# Patient Record
Sex: MALE | Race: WHITE | NOT HISPANIC OR LATINO | URBAN - METROPOLITAN AREA
[De-identification: names, ages, dates, MRNs, and addresses within clinical notes are randomized per-mention and may not be internally consistent; named-entity substitution may affect disease eponyms.]

---

## 2017-09-27 ENCOUNTER — EMERGENCY (EMERGENCY)
Facility: HOSPITAL | Age: 62
LOS: 0 days | Discharge: HOME | End: 2017-09-27
Attending: EMERGENCY MEDICINE
Payer: SELF-PAY

## 2017-09-27 VITALS
OXYGEN SATURATION: 99 % | HEIGHT: 74 IN | WEIGHT: 210.1 LBS | DIASTOLIC BLOOD PRESSURE: 123 MMHG | HEART RATE: 75 BPM | SYSTOLIC BLOOD PRESSURE: 203 MMHG | RESPIRATION RATE: 16 BRPM | TEMPERATURE: 97 F

## 2017-09-27 VITALS
RESPIRATION RATE: 16 BRPM | TEMPERATURE: 98 F | DIASTOLIC BLOOD PRESSURE: 88 MMHG | SYSTOLIC BLOOD PRESSURE: 176 MMHG | HEART RATE: 71 BPM | OXYGEN SATURATION: 99 %

## 2017-09-27 DIAGNOSIS — I10 ESSENTIAL (PRIMARY) HYPERTENSION: ICD-10-CM

## 2017-09-27 DIAGNOSIS — Z91.14 PATIENT'S OTHER NONCOMPLIANCE WITH MEDICATION REGIMEN: ICD-10-CM

## 2017-09-27 DIAGNOSIS — R42 DIZZINESS AND GIDDINESS: ICD-10-CM

## 2017-09-27 DIAGNOSIS — R51 HEADACHE: ICD-10-CM

## 2017-09-27 DIAGNOSIS — E11.9 TYPE 2 DIABETES MELLITUS WITHOUT COMPLICATIONS: ICD-10-CM

## 2017-09-27 DIAGNOSIS — Z87.891 PERSONAL HISTORY OF NICOTINE DEPENDENCE: ICD-10-CM

## 2017-09-27 LAB
ALBUMIN SERPL ELPH-MCNC: 3.8 G/DL — SIGNIFICANT CHANGE UP (ref 3.3–5)
ALP SERPL-CCNC: 113 U/L — SIGNIFICANT CHANGE UP (ref 40–120)
ALT FLD-CCNC: 23 U/L — SIGNIFICANT CHANGE UP (ref 12–78)
ANION GAP SERPL CALC-SCNC: 8 MMOL/L — SIGNIFICANT CHANGE UP (ref 5–17)
APPEARANCE UR: CLEAR — SIGNIFICANT CHANGE UP
AST SERPL-CCNC: 15 U/L — SIGNIFICANT CHANGE UP (ref 15–37)
BASE EXCESS BLDV CALC-SCNC: 1.6 MMOL/L — SIGNIFICANT CHANGE UP (ref -2–2)
BASOPHILS # BLD AUTO: 0.1 K/UL — SIGNIFICANT CHANGE UP (ref 0–0.2)
BASOPHILS NFR BLD AUTO: 1.6 % — SIGNIFICANT CHANGE UP (ref 0–2)
BILIRUB SERPL-MCNC: 0.5 MG/DL — SIGNIFICANT CHANGE UP (ref 0.2–1.2)
BILIRUB UR-MCNC: NEGATIVE — SIGNIFICANT CHANGE UP
BLOOD GAS COMMENTS, VENOUS: SIGNIFICANT CHANGE UP
BUN SERPL-MCNC: 11 MG/DL — SIGNIFICANT CHANGE UP (ref 7–23)
CALCIUM SERPL-MCNC: 8 MG/DL — LOW (ref 8.5–10.1)
CHLORIDE SERPL-SCNC: 106 MMOL/L — SIGNIFICANT CHANGE UP (ref 96–108)
CK MB BLD-MCNC: 1.1 % — SIGNIFICANT CHANGE UP (ref 0–3.5)
CK MB CFR SERPL CALC: 1.7 NG/ML — SIGNIFICANT CHANGE UP (ref 0.5–3.6)
CK SERPL-CCNC: 149 U/L — SIGNIFICANT CHANGE UP (ref 26–308)
CO2 SERPL-SCNC: 25 MMOL/L — SIGNIFICANT CHANGE UP (ref 22–31)
COLOR SPEC: YELLOW — SIGNIFICANT CHANGE UP
CREAT SERPL-MCNC: 0.78 MG/DL — SIGNIFICANT CHANGE UP (ref 0.5–1.3)
DIFF PNL FLD: NEGATIVE — SIGNIFICANT CHANGE UP
EOSINOPHIL # BLD AUTO: 0.5 K/UL — SIGNIFICANT CHANGE UP (ref 0–0.5)
EOSINOPHIL NFR BLD AUTO: 7.3 % — HIGH (ref 0–6)
GAS PNL BLDV: SIGNIFICANT CHANGE UP
GLUCOSE SERPL-MCNC: 136 MG/DL — HIGH (ref 70–99)
GLUCOSE UR QL: NEGATIVE MG/DL — SIGNIFICANT CHANGE UP
HCO3 BLDV-SCNC: 26 MMOL/L — SIGNIFICANT CHANGE UP (ref 21–29)
HCT VFR BLD CALC: 40.4 % — SIGNIFICANT CHANGE UP (ref 39–50)
HGB BLD-MCNC: 13.9 G/DL — SIGNIFICANT CHANGE UP (ref 13–17)
HOROWITZ INDEX BLDV+IHG-RTO: 21 — SIGNIFICANT CHANGE UP
KETONES UR-MCNC: NEGATIVE — SIGNIFICANT CHANGE UP
LEUKOCYTE ESTERASE UR-ACNC: NEGATIVE — SIGNIFICANT CHANGE UP
LYMPHOCYTES # BLD AUTO: 1.6 K/UL — SIGNIFICANT CHANGE UP (ref 1–3.3)
LYMPHOCYTES # BLD AUTO: 24.9 % — SIGNIFICANT CHANGE UP (ref 13–44)
MCHC RBC-ENTMCNC: 29.6 PG — SIGNIFICANT CHANGE UP (ref 27–34)
MCHC RBC-ENTMCNC: 34.4 GM/DL — SIGNIFICANT CHANGE UP (ref 32–36)
MCV RBC AUTO: 86.1 FL — SIGNIFICANT CHANGE UP (ref 80–100)
MONOCYTES # BLD AUTO: 0.6 K/UL — SIGNIFICANT CHANGE UP (ref 0–0.9)
MONOCYTES NFR BLD AUTO: 8.9 % — SIGNIFICANT CHANGE UP (ref 2–14)
NEUTROPHILS # BLD AUTO: 3.6 K/UL — SIGNIFICANT CHANGE UP (ref 1.8–7.4)
NEUTROPHILS NFR BLD AUTO: 57.2 % — SIGNIFICANT CHANGE UP (ref 43–77)
NITRITE UR-MCNC: NEGATIVE — SIGNIFICANT CHANGE UP
PCO2 BLDV: 43 MMHG — SIGNIFICANT CHANGE UP (ref 35–50)
PH BLDV: 7.4 — SIGNIFICANT CHANGE UP (ref 7.35–7.45)
PH UR: 7 — SIGNIFICANT CHANGE UP (ref 5–8)
PLATELET # BLD AUTO: 238 K/UL — SIGNIFICANT CHANGE UP (ref 150–400)
PO2 BLDV: <40 MMHG — SIGNIFICANT CHANGE UP (ref 25–45)
POTASSIUM SERPL-MCNC: 3.8 MMOL/L — SIGNIFICANT CHANGE UP (ref 3.5–5.3)
POTASSIUM SERPL-SCNC: 3.8 MMOL/L — SIGNIFICANT CHANGE UP (ref 3.5–5.3)
PROT SERPL-MCNC: 7.7 GM/DL — SIGNIFICANT CHANGE UP (ref 6–8.3)
PROT UR-MCNC: NEGATIVE MG/DL — SIGNIFICANT CHANGE UP
RBC # BLD: 4.7 M/UL — SIGNIFICANT CHANGE UP (ref 4.2–5.8)
RBC # FLD: 11.7 % — SIGNIFICANT CHANGE UP (ref 11–15)
SAO2 % BLDV: 76 % — SIGNIFICANT CHANGE UP (ref 67–88)
SODIUM SERPL-SCNC: 139 MMOL/L — SIGNIFICANT CHANGE UP (ref 135–145)
SP GR SPEC: 1 — LOW (ref 1.01–1.02)
TROPONIN I SERPL-MCNC: <.015 NG/ML — SIGNIFICANT CHANGE UP (ref 0.01–0.04)
UROBILINOGEN FLD QL: NEGATIVE MG/DL — SIGNIFICANT CHANGE UP
WBC # BLD: 6.3 K/UL — SIGNIFICANT CHANGE UP (ref 3.8–10.5)
WBC # FLD AUTO: 6.3 K/UL — SIGNIFICANT CHANGE UP (ref 3.8–10.5)

## 2017-09-27 PROCEDURE — 99053 MED SERV 10PM-8AM 24 HR FAC: CPT

## 2017-09-27 PROCEDURE — 99285 EMERGENCY DEPT VISIT HI MDM: CPT | Mod: 25

## 2017-09-27 PROCEDURE — 70450 CT HEAD/BRAIN W/O DYE: CPT | Mod: 26

## 2017-09-27 PROCEDURE — 93010 ELECTROCARDIOGRAM REPORT: CPT

## 2017-09-27 RX ORDER — HYDRALAZINE HCL 50 MG
10 TABLET ORAL ONCE
Qty: 0 | Refills: 0 | Status: COMPLETED | OUTPATIENT
Start: 2017-09-27 | End: 2017-09-27

## 2017-09-27 RX ORDER — AMLODIPINE BESYLATE 2.5 MG/1
1 TABLET ORAL
Qty: 30 | Refills: 0 | OUTPATIENT
Start: 2017-09-27 | End: 2017-10-27

## 2017-09-27 RX ORDER — LABETALOL HCL 100 MG
10 TABLET ORAL ONCE
Qty: 0 | Refills: 0 | Status: COMPLETED | OUTPATIENT
Start: 2017-09-27 | End: 2017-09-27

## 2017-09-27 RX ORDER — AMLODIPINE BESYLATE 2.5 MG/1
1 TABLET ORAL
Qty: 30 | Refills: 0
Start: 2017-09-27 | End: 2017-10-27

## 2017-09-27 RX ORDER — LABETALOL HCL 100 MG
20 TABLET ORAL ONCE
Qty: 0 | Refills: 0 | Status: DISCONTINUED | OUTPATIENT
Start: 2017-09-27 | End: 2017-09-27

## 2017-09-27 RX ADMIN — Medication 10 MILLIGRAM(S): at 08:19

## 2017-09-27 RX ADMIN — Medication 10 MILLIGRAM(S): at 14:49

## 2017-09-27 NOTE — ED ADULT NURSE NOTE - OBJECTIVE STATEMENT
pt c/o dizziness last night while walking, pt checked bp at home, spb 180. pt states has not taken meds for DM or htn. denies cp, sob,

## 2017-09-27 NOTE — ED PROVIDER NOTE - MEDICAL DECISION MAKING DETAILS
61yo M pmh as above, non compliant w/ meds, here for headache, dizziness, high BP. No acute changes on CT head, labs not concerning. Will dc on amlodipine with instructions for immediate pmd f/u for further BP control. Pt understands and agrees w/ plan.

## 2017-09-27 NOTE — ED PROVIDER NOTE - PHYSICAL EXAMINATION
Gen: Alert, NAD  Head: NC, AT, PERRL, EOMI, normal lids/conjunctiva  ENT:  normal hearing, patent oropharynx without erythema/exudate, uvula midline  Neck: +supple, no tenderness/meningismus/JVD, +Trachea midline  Chest: no chest wall tenderness, equal chest rise  Pulm: Bilateral BS, normal resp effort, no wheeze/stridor/retractions  CV: RRR, no M/R/G, +dist pulses  Abd: soft, NT/ND, +BS, no hepatosplenomegaly  Rectal: deferred  Mskel: no edema/erythema/cyanosis  Skin: no rash  Neuro: AAOx3, no sensory/motor deficits, CN 2-12 intact

## 2017-09-27 NOTE — ED PROVIDER NOTE - OBJECTIVE STATEMENT
Pertinent PMH/PSH/FHx/SHx and Review of Systems contained within:  61yo M pmh of diabetes (diet controlled), and htn (not on any meds), presents c/o transient lightheadedness while walking ystdy and again this morning, lasting a few seconds each time. Pt took BP at home last night and systolics were in 180s, then took again this morning and systolic was 190s, therefore decided to come for evaluation. Pt reports that he was seen by endocrinologist last year who wanted to start him Pertinent PMH/PSH/FHx/SHx and Review of Systems contained within:  61yo M pmh of diabetes (diet controlled), and htn (not on any meds), presents c/o transient lightheadedness while walking ystdy and again this morning, lasting a few seconds each time. Pt took BP at home last night and systolics were in 180s, then took again this morning and systolic was 190s, therefore decided to come for evaluation. Pt reports that he was seen by endocrinologist last year who wanted to start him on insulin, but pt refused and has been taking it upon himself to control DM with diet (states FS have been in 120s and 130s). At the time, doctor also told him his BP was high and wanted to start anti-hypertensive, but pt refused. He denies ha or focal neuro symptoms. Stopped smoking 10years ago, occasional etoh use, denies drug use. No blood thinners. No fever/chills, No photophobia/eye pain/changes in vision, No ear pain/sore throat/dysphagia, No chest pain/palpitations, no SOB/cough/wheeze/stridor, No abdominal pain, No N/V/D, no dysuria/frequency/discharge, No neck/back pain, no rash.

## 2020-01-01 ENCOUNTER — EMERGENCY (EMERGENCY)
Facility: HOSPITAL | Age: 65
LOS: 1 days | Discharge: HOME | End: 2020-01-01
Attending: EMERGENCY MEDICINE
Payer: MEDICAID

## 2020-01-01 ENCOUNTER — INPATIENT (INPATIENT)
Facility: HOSPITAL | Age: 65
LOS: 5 days | Discharge: ANOTHER HOSPITAL | DRG: 637 | End: 2020-04-09
Attending: INTERNAL MEDICINE | Admitting: INTERNAL MEDICINE
Payer: MEDICAID

## 2020-01-01 ENCOUNTER — INPATIENT (INPATIENT)
Facility: HOSPITAL | Age: 65
LOS: 2 days | DRG: 137 | End: 2020-04-12
Attending: INTERNAL MEDICINE | Admitting: INTERNAL MEDICINE
Payer: MEDICAID

## 2020-01-01 VITALS — OXYGEN SATURATION: 95 % | HEART RATE: 101 BPM

## 2020-01-01 VITALS
RESPIRATION RATE: 16 BRPM | WEIGHT: 199.96 LBS | TEMPERATURE: 98 F | DIASTOLIC BLOOD PRESSURE: 108 MMHG | SYSTOLIC BLOOD PRESSURE: 163 MMHG | HEART RATE: 70 BPM | HEIGHT: 74 IN | OXYGEN SATURATION: 98 %

## 2020-01-01 VITALS
SYSTOLIC BLOOD PRESSURE: 150 MMHG | HEIGHT: 74 IN | RESPIRATION RATE: 24 BRPM | WEIGHT: 169.98 LBS | TEMPERATURE: 99 F | OXYGEN SATURATION: 99 % | DIASTOLIC BLOOD PRESSURE: 114 MMHG | HEART RATE: 118 BPM

## 2020-01-01 VITALS — HEART RATE: 119 BPM | RESPIRATION RATE: 10 BRPM | OXYGEN SATURATION: 74 %

## 2020-01-01 VITALS
DIASTOLIC BLOOD PRESSURE: 77 MMHG | SYSTOLIC BLOOD PRESSURE: 98 MMHG | RESPIRATION RATE: 26 BRPM | HEART RATE: 100 BPM | OXYGEN SATURATION: 98 %

## 2020-01-01 DIAGNOSIS — J44.9 CHRONIC OBSTRUCTIVE PULMONARY DISEASE, UNSPECIFIED: ICD-10-CM

## 2020-01-01 DIAGNOSIS — E11.65 TYPE 2 DIABETES MELLITUS WITH HYPERGLYCEMIA: ICD-10-CM

## 2020-01-01 DIAGNOSIS — E87.0 HYPEROSMOLALITY AND HYPERNATREMIA: ICD-10-CM

## 2020-01-01 DIAGNOSIS — I95.9 HYPOTENSION, UNSPECIFIED: ICD-10-CM

## 2020-01-01 DIAGNOSIS — E11.10 TYPE 2 DIABETES MELLITUS WITH KETOACIDOSIS WITHOUT COMA: ICD-10-CM

## 2020-01-01 DIAGNOSIS — J96.01 ACUTE RESPIRATORY FAILURE WITH HYPOXIA: ICD-10-CM

## 2020-01-01 DIAGNOSIS — E10.10 TYPE 1 DIABETES MELLITUS WITH KETOACIDOSIS WITHOUT COMA: ICD-10-CM

## 2020-01-01 DIAGNOSIS — J18.9 PNEUMONIA, UNSPECIFIED ORGANISM: ICD-10-CM

## 2020-01-01 DIAGNOSIS — Z91.14 PATIENT'S OTHER NONCOMPLIANCE WITH MEDICATION REGIMEN: ICD-10-CM

## 2020-01-01 DIAGNOSIS — E87.6 HYPOKALEMIA: ICD-10-CM

## 2020-01-01 DIAGNOSIS — E83.39 OTHER DISORDERS OF PHOSPHORUS METABOLISM: ICD-10-CM

## 2020-01-01 DIAGNOSIS — E11.9 TYPE 2 DIABETES MELLITUS WITHOUT COMPLICATIONS: ICD-10-CM

## 2020-01-01 DIAGNOSIS — J69.0 PNEUMONITIS DUE TO INHALATION OF FOOD AND VOMIT: ICD-10-CM

## 2020-01-01 DIAGNOSIS — Z29.9 ENCOUNTER FOR PROPHYLACTIC MEASURES, UNSPECIFIED: ICD-10-CM

## 2020-01-01 DIAGNOSIS — Z91.11 PATIENT'S NONCOMPLIANCE WITH DIETARY REGIMEN: ICD-10-CM

## 2020-01-01 DIAGNOSIS — I47.2 VENTRICULAR TACHYCARDIA: ICD-10-CM

## 2020-01-01 DIAGNOSIS — Z66 DO NOT RESUSCITATE: ICD-10-CM

## 2020-01-01 LAB
4/8 RATIO: 1.59 RATIO — SIGNIFICANT CHANGE UP (ref 0.9–3.6)
4/8 RATIO: 2.2 RATIO — SIGNIFICANT CHANGE UP (ref 0.9–3.6)
A-TUMOR NECROSIS FACT SERPL-MCNC: <5 PG/ML — SIGNIFICANT CHANGE UP
ABS CD8: 161 /UL — SIGNIFICANT CHANGE UP (ref 142–740)
ABS CD8: 169 /UL — SIGNIFICANT CHANGE UP (ref 142–740)
ACETONE SERPL-MCNC: ABNORMAL
ALBUMIN SERPL ELPH-MCNC: 1.8 G/DL — LOW (ref 3.3–5)
ALBUMIN SERPL ELPH-MCNC: 2 G/DL — LOW (ref 3.5–5)
ALBUMIN SERPL ELPH-MCNC: 2 G/DL — LOW (ref 3.5–5)
ALBUMIN SERPL ELPH-MCNC: 2.2 G/DL — LOW (ref 3.5–5)
ALBUMIN SERPL ELPH-MCNC: 2.5 G/DL — LOW (ref 3.5–5)
ALBUMIN SERPL ELPH-MCNC: 2.5 G/DL — LOW (ref 3.5–5)
ALBUMIN SERPL ELPH-MCNC: 3.2 G/DL — LOW (ref 3.5–5)
ALBUMIN SERPL ELPH-MCNC: SIGNIFICANT CHANGE UP G/DL (ref 3.5–5)
ALP SERPL-CCNC: 114 U/L — SIGNIFICANT CHANGE UP (ref 40–120)
ALP SERPL-CCNC: 115 U/L — SIGNIFICANT CHANGE UP (ref 40–120)
ALP SERPL-CCNC: 130 U/L — HIGH (ref 40–120)
ALP SERPL-CCNC: 167 U/L — HIGH (ref 40–120)
ALP SERPL-CCNC: 207 U/L — HIGH (ref 40–120)
ALP SERPL-CCNC: 220 U/L — HIGH (ref 40–120)
ALP SERPL-CCNC: 94 U/L — SIGNIFICANT CHANGE UP (ref 40–120)
ALP SERPL-CCNC: SIGNIFICANT CHANGE UP U/L (ref 40–120)
ALT FLD-CCNC: 34 U/L DA — SIGNIFICANT CHANGE UP (ref 10–60)
ALT FLD-CCNC: 36 U/L DA — SIGNIFICANT CHANGE UP (ref 10–60)
ALT FLD-CCNC: 37 U/L DA — SIGNIFICANT CHANGE UP (ref 10–60)
ALT FLD-CCNC: 41 U/L — SIGNIFICANT CHANGE UP (ref 12–78)
ALT FLD-CCNC: 43 U/L DA — SIGNIFICANT CHANGE UP (ref 10–60)
ALT FLD-CCNC: 46 U/L DA — SIGNIFICANT CHANGE UP (ref 10–60)
ALT FLD-CCNC: 52 U/L DA — SIGNIFICANT CHANGE UP (ref 10–60)
ALT FLD-CCNC: SIGNIFICANT CHANGE UP U/L DA (ref 10–60)
ANION GAP SERPL CALC-SCNC: 10 MMOL/L — SIGNIFICANT CHANGE UP (ref 5–17)
ANION GAP SERPL CALC-SCNC: 12 MMOL/L — SIGNIFICANT CHANGE UP (ref 5–17)
ANION GAP SERPL CALC-SCNC: 12 MMOL/L — SIGNIFICANT CHANGE UP (ref 5–17)
ANION GAP SERPL CALC-SCNC: 13 MMOL/L — SIGNIFICANT CHANGE UP (ref 5–17)
ANION GAP SERPL CALC-SCNC: 15 MMOL/L — SIGNIFICANT CHANGE UP (ref 5–17)
ANION GAP SERPL CALC-SCNC: 16 MMOL/L — SIGNIFICANT CHANGE UP (ref 5–17)
ANION GAP SERPL CALC-SCNC: 17 MMOL/L — SIGNIFICANT CHANGE UP (ref 5–17)
ANION GAP SERPL CALC-SCNC: 18 MMOL/L — HIGH (ref 5–17)
ANION GAP SERPL CALC-SCNC: 19 MMOL/L — HIGH (ref 5–17)
ANION GAP SERPL CALC-SCNC: 20 MMOL/L — HIGH (ref 5–17)
ANION GAP SERPL CALC-SCNC: 24 MMOL/L — HIGH (ref 5–17)
ANION GAP SERPL CALC-SCNC: 4 MMOL/L — LOW (ref 5–17)
ANION GAP SERPL CALC-SCNC: 5 MMOL/L — SIGNIFICANT CHANGE UP (ref 5–17)
ANION GAP SERPL CALC-SCNC: 6 MMOL/L — SIGNIFICANT CHANGE UP (ref 5–17)
ANION GAP SERPL CALC-SCNC: 6 MMOL/L — SIGNIFICANT CHANGE UP (ref 5–17)
ANION GAP SERPL CALC-SCNC: 7 MMOL/L — SIGNIFICANT CHANGE UP (ref 5–17)
ANION GAP SERPL CALC-SCNC: 7 MMOL/L — SIGNIFICANT CHANGE UP (ref 5–17)
ANION GAP SERPL CALC-SCNC: 8 MMOL/L — SIGNIFICANT CHANGE UP (ref 5–17)
ANION GAP SERPL CALC-SCNC: 8 MMOL/L — SIGNIFICANT CHANGE UP (ref 5–17)
ANION GAP SERPL CALC-SCNC: SIGNIFICANT CHANGE UP MMOL/L (ref 5–17)
ANISOCYTOSIS BLD QL: SLIGHT — SIGNIFICANT CHANGE UP
APPEARANCE UR: CLEAR — SIGNIFICANT CHANGE UP
APTT BLD: 27.8 SEC — SIGNIFICANT CHANGE UP (ref 27.5–36.3)
AST SERPL-CCNC: 101 U/L — HIGH (ref 10–40)
AST SERPL-CCNC: 27 U/L — SIGNIFICANT CHANGE UP (ref 10–40)
AST SERPL-CCNC: 34 U/L — SIGNIFICANT CHANGE UP (ref 10–40)
AST SERPL-CCNC: 41 U/L — HIGH (ref 10–40)
AST SERPL-CCNC: 42 U/L — HIGH (ref 10–40)
AST SERPL-CCNC: 43 U/L — HIGH (ref 15–37)
AST SERPL-CCNC: 55 U/L — HIGH (ref 10–40)
AST SERPL-CCNC: SIGNIFICANT CHANGE UP U/L (ref 10–40)
BACTERIA # UR AUTO: ABNORMAL /HPF
BASE EXCESS BLDA CALC-SCNC: -1 MMOL/L — SIGNIFICANT CHANGE UP (ref -2–2)
BASE EXCESS BLDA CALC-SCNC: -1.2 MMOL/L — SIGNIFICANT CHANGE UP (ref -2–2)
BASE EXCESS BLDA CALC-SCNC: -1.3 MMOL/L — SIGNIFICANT CHANGE UP (ref -2–2)
BASE EXCESS BLDA CALC-SCNC: -1.8 MMOL/L — SIGNIFICANT CHANGE UP (ref -2–2)
BASE EXCESS BLDA CALC-SCNC: -14.2 MMOL/L — LOW (ref -2–2)
BASE EXCESS BLDA CALC-SCNC: -2.2 MMOL/L — LOW (ref -2–2)
BASE EXCESS BLDA CALC-SCNC: -2.3 MMOL/L — LOW (ref -2–2)
BASE EXCESS BLDA CALC-SCNC: -23.2 MMOL/L — LOW (ref -2–2)
BASE EXCESS BLDA CALC-SCNC: -23.2 MMOL/L — LOW (ref -2–2)
BASE EXCESS BLDA CALC-SCNC: -8.7 MMOL/L — LOW (ref -2–2)
BASOPHILS # BLD AUTO: 0.02 K/UL — SIGNIFICANT CHANGE UP (ref 0–0.2)
BASOPHILS # BLD AUTO: 0.03 K/UL — SIGNIFICANT CHANGE UP (ref 0–0.2)
BASOPHILS NFR BLD AUTO: 0.2 % — SIGNIFICANT CHANGE UP (ref 0–2)
BASOPHILS NFR BLD AUTO: 0.3 % — SIGNIFICANT CHANGE UP (ref 0–2)
BASOPHILS NFR BLD AUTO: 0.3 % — SIGNIFICANT CHANGE UP (ref 0–2)
BASOPHILS NFR BLD AUTO: 0.4 % — SIGNIFICANT CHANGE UP (ref 0–2)
BASOPHILS NFR BLD AUTO: 0.4 % — SIGNIFICANT CHANGE UP (ref 0–2)
BILIRUB SERPL-MCNC: 0.3 MG/DL — SIGNIFICANT CHANGE UP (ref 0.2–1.2)
BILIRUB SERPL-MCNC: 0.4 MG/DL — SIGNIFICANT CHANGE UP (ref 0.2–1.2)
BILIRUB SERPL-MCNC: 0.5 MG/DL — SIGNIFICANT CHANGE UP (ref 0.2–1.2)
BILIRUB SERPL-MCNC: 0.5 MG/DL — SIGNIFICANT CHANGE UP (ref 0.2–1.2)
BILIRUB SERPL-MCNC: SIGNIFICANT CHANGE UP MG/DL (ref 0.2–1.2)
BILIRUB UR-MCNC: NEGATIVE — SIGNIFICANT CHANGE UP
BLOOD GAS COMMENTS ARTERIAL: SIGNIFICANT CHANGE UP
BUN SERPL-MCNC: 15 MG/DL — SIGNIFICANT CHANGE UP (ref 7–18)
BUN SERPL-MCNC: 16 MG/DL — SIGNIFICANT CHANGE UP (ref 7–18)
BUN SERPL-MCNC: 17 MG/DL — SIGNIFICANT CHANGE UP (ref 7–18)
BUN SERPL-MCNC: 17 MG/DL — SIGNIFICANT CHANGE UP (ref 7–18)
BUN SERPL-MCNC: 19 MG/DL — HIGH (ref 7–18)
BUN SERPL-MCNC: 23 MG/DL — HIGH (ref 7–18)
BUN SERPL-MCNC: 24 MG/DL — HIGH (ref 7–18)
BUN SERPL-MCNC: 24 MG/DL — HIGH (ref 7–18)
BUN SERPL-MCNC: 25 MG/DL — HIGH (ref 7–18)
BUN SERPL-MCNC: 29 MG/DL — HIGH (ref 7–18)
BUN SERPL-MCNC: 30 MG/DL — HIGH (ref 7–18)
BUN SERPL-MCNC: 35 MG/DL — HIGH (ref 7–18)
BUN SERPL-MCNC: 38 MG/DL — HIGH (ref 7–18)
BUN SERPL-MCNC: 41 MG/DL — HIGH (ref 7–18)
BUN SERPL-MCNC: 43 MG/DL — HIGH (ref 7–23)
BUN SERPL-MCNC: 46 MG/DL — HIGH (ref 7–23)
BUN SERPL-MCNC: 49 MG/DL — HIGH (ref 7–23)
BUN SERPL-MCNC: SIGNIFICANT CHANGE UP MG/DL (ref 7–18)
CALCIUM SERPL-MCNC: 7.8 MG/DL — LOW (ref 8.5–10.1)
CALCIUM SERPL-MCNC: 7.8 MG/DL — LOW (ref 8.5–10.1)
CALCIUM SERPL-MCNC: 8.1 MG/DL — LOW (ref 8.4–10.5)
CALCIUM SERPL-MCNC: 8.2 MG/DL — LOW (ref 8.4–10.5)
CALCIUM SERPL-MCNC: 8.2 MG/DL — LOW (ref 8.5–10.1)
CALCIUM SERPL-MCNC: 8.3 MG/DL — LOW (ref 8.4–10.5)
CALCIUM SERPL-MCNC: 8.5 MG/DL — SIGNIFICANT CHANGE UP (ref 8.4–10.5)
CALCIUM SERPL-MCNC: 8.6 MG/DL — SIGNIFICANT CHANGE UP (ref 8.4–10.5)
CALCIUM SERPL-MCNC: 8.6 MG/DL — SIGNIFICANT CHANGE UP (ref 8.4–10.5)
CALCIUM SERPL-MCNC: 8.7 MG/DL — SIGNIFICANT CHANGE UP (ref 8.4–10.5)
CALCIUM SERPL-MCNC: 8.8 MG/DL — SIGNIFICANT CHANGE UP (ref 8.4–10.5)
CALCIUM SERPL-MCNC: 9.1 MG/DL — SIGNIFICANT CHANGE UP (ref 8.4–10.5)
CALCIUM SERPL-MCNC: 9.4 MG/DL — SIGNIFICANT CHANGE UP (ref 8.4–10.5)
CALCIUM SERPL-MCNC: 9.7 MG/DL — SIGNIFICANT CHANGE UP (ref 8.4–10.5)
CALCIUM SERPL-MCNC: SIGNIFICANT CHANGE UP MG/DL (ref 8.4–10.5)
CD3 BLASTS SPEC-ACNC: 39 % — LOW (ref 59–83)
CD3 BLASTS SPEC-ACNC: 451 /UL — LOW (ref 672–1870)
CD3 BLASTS SPEC-ACNC: 557 /UL — LOW (ref 672–1870)
CD3 BLASTS SPEC-ACNC: 64 % — SIGNIFICANT CHANGE UP (ref 59–83)
CD4 %: 22 % — LOW (ref 30–62)
CD4 %: 43 % — SIGNIFICANT CHANGE UP (ref 30–62)
CD8 %: 14 % — SIGNIFICANT CHANGE UP (ref 12–36)
CD8 %: 19 % — SIGNIFICANT CHANGE UP (ref 12–36)
CHLORIDE SERPL-SCNC: 100 MMOL/L — SIGNIFICANT CHANGE UP (ref 96–108)
CHLORIDE SERPL-SCNC: 110 MMOL/L — HIGH (ref 96–108)
CHLORIDE SERPL-SCNC: 117 MMOL/L — HIGH (ref 96–108)
CHLORIDE SERPL-SCNC: 119 MMOL/L — HIGH (ref 96–108)
CHLORIDE SERPL-SCNC: 119 MMOL/L — HIGH (ref 96–108)
CHLORIDE SERPL-SCNC: 120 MMOL/L — HIGH (ref 96–108)
CHLORIDE SERPL-SCNC: 123 MMOL/L — HIGH (ref 96–108)
CHLORIDE SERPL-SCNC: 125 MMOL/L — HIGH (ref 96–108)
CHLORIDE SERPL-SCNC: 125 MMOL/L — HIGH (ref 96–108)
CHLORIDE SERPL-SCNC: 126 MMOL/L — HIGH (ref 96–108)
CHLORIDE SERPL-SCNC: 129 MMOL/L — HIGH (ref 96–108)
CHLORIDE SERPL-SCNC: 130 MMOL/L — HIGH (ref 96–108)
CHLORIDE SERPL-SCNC: 131 MMOL/L — HIGH (ref 96–108)
CHLORIDE SERPL-SCNC: 131 MMOL/L — HIGH (ref 96–108)
CHLORIDE SERPL-SCNC: 132 MMOL/L — HIGH (ref 96–108)
CHLORIDE SERPL-SCNC: 135 MMOL/L — HIGH (ref 96–108)
CHLORIDE SERPL-SCNC: 135 MMOL/L — HIGH (ref 96–108)
CHLORIDE SERPL-SCNC: SIGNIFICANT CHANGE UP MMOL/L (ref 96–108)
CK SERPL-CCNC: SIGNIFICANT CHANGE UP U/L (ref 35–232)
CO2 SERPL-SCNC: 10 MMOL/L — CRITICAL LOW (ref 22–31)
CO2 SERPL-SCNC: 12 MMOL/L — LOW (ref 22–31)
CO2 SERPL-SCNC: 13 MMOL/L — LOW (ref 22–31)
CO2 SERPL-SCNC: 13 MMOL/L — LOW (ref 22–31)
CO2 SERPL-SCNC: 14 MMOL/L — LOW (ref 22–31)
CO2 SERPL-SCNC: 15 MMOL/L — LOW (ref 22–31)
CO2 SERPL-SCNC: 16 MMOL/L — LOW (ref 22–31)
CO2 SERPL-SCNC: 19 MMOL/L — LOW (ref 22–31)
CO2 SERPL-SCNC: 20 MMOL/L — LOW (ref 22–31)
CO2 SERPL-SCNC: 22 MMOL/L — SIGNIFICANT CHANGE UP (ref 22–31)
CO2 SERPL-SCNC: 22 MMOL/L — SIGNIFICANT CHANGE UP (ref 22–31)
CO2 SERPL-SCNC: 23 MMOL/L — SIGNIFICANT CHANGE UP (ref 22–31)
CO2 SERPL-SCNC: 23 MMOL/L — SIGNIFICANT CHANGE UP (ref 22–31)
CO2 SERPL-SCNC: 24 MMOL/L — SIGNIFICANT CHANGE UP (ref 22–31)
CO2 SERPL-SCNC: 25 MMOL/L — SIGNIFICANT CHANGE UP (ref 22–31)
CO2 SERPL-SCNC: 25 MMOL/L — SIGNIFICANT CHANGE UP (ref 22–31)
CO2 SERPL-SCNC: 26 MMOL/L — SIGNIFICANT CHANGE UP (ref 22–31)
CO2 SERPL-SCNC: 26 MMOL/L — SIGNIFICANT CHANGE UP (ref 22–31)
CO2 SERPL-SCNC: 9 MMOL/L — CRITICAL LOW (ref 22–31)
CO2 SERPL-SCNC: SIGNIFICANT CHANGE UP MMOL/L (ref 22–31)
COLOR SPEC: YELLOW — SIGNIFICANT CHANGE UP
CREAT SERPL-MCNC: 0.91 MG/DL — SIGNIFICANT CHANGE UP (ref 0.5–1.3)
CREAT SERPL-MCNC: 0.95 MG/DL — SIGNIFICANT CHANGE UP (ref 0.5–1.3)
CREAT SERPL-MCNC: 0.98 MG/DL — SIGNIFICANT CHANGE UP (ref 0.5–1.3)
CREAT SERPL-MCNC: 0.99 MG/DL — SIGNIFICANT CHANGE UP (ref 0.5–1.3)
CREAT SERPL-MCNC: 1.05 MG/DL — SIGNIFICANT CHANGE UP (ref 0.5–1.3)
CREAT SERPL-MCNC: 1.05 MG/DL — SIGNIFICANT CHANGE UP (ref 0.5–1.3)
CREAT SERPL-MCNC: 1.08 MG/DL — SIGNIFICANT CHANGE UP (ref 0.5–1.3)
CREAT SERPL-MCNC: 1.08 MG/DL — SIGNIFICANT CHANGE UP (ref 0.5–1.3)
CREAT SERPL-MCNC: 1.12 MG/DL — SIGNIFICANT CHANGE UP (ref 0.5–1.3)
CREAT SERPL-MCNC: 1.12 MG/DL — SIGNIFICANT CHANGE UP (ref 0.5–1.3)
CREAT SERPL-MCNC: 1.17 MG/DL — SIGNIFICANT CHANGE UP (ref 0.5–1.3)
CREAT SERPL-MCNC: 1.18 MG/DL — SIGNIFICANT CHANGE UP (ref 0.5–1.3)
CREAT SERPL-MCNC: 1.2 MG/DL — SIGNIFICANT CHANGE UP (ref 0.5–1.3)
CREAT SERPL-MCNC: 1.27 MG/DL — SIGNIFICANT CHANGE UP (ref 0.5–1.3)
CREAT SERPL-MCNC: 1.31 MG/DL — HIGH (ref 0.5–1.3)
CREAT SERPL-MCNC: 1.49 MG/DL — HIGH (ref 0.5–1.3)
CREAT SERPL-MCNC: 1.51 MG/DL — HIGH (ref 0.5–1.3)
CREAT SERPL-MCNC: 1.54 MG/DL — HIGH (ref 0.5–1.3)
CREAT SERPL-MCNC: 1.62 MG/DL — HIGH (ref 0.5–1.3)
CREAT SERPL-MCNC: 1.62 MG/DL — HIGH (ref 0.5–1.3)
CREAT SERPL-MCNC: 1.65 MG/DL — HIGH (ref 0.5–1.3)
CREAT SERPL-MCNC: 1.7 MG/DL — HIGH (ref 0.5–1.3)
CRP SERPL-MCNC: 10.68 MG/DL — HIGH (ref 0–0.4)
CRP SERPL-MCNC: 6.64 MG/DL — HIGH (ref 0–0.4)
CULTURE RESULTS: SIGNIFICANT CHANGE UP
D DIMER BLD IA.RAPID-MCNC: 1727 NG/ML DDU — HIGH
DIFF PNL FLD: ABNORMAL
EOSINOPHIL # BLD AUTO: 0 K/UL — SIGNIFICANT CHANGE UP (ref 0–0.5)
EOSINOPHIL # BLD AUTO: 0.01 K/UL — SIGNIFICANT CHANGE UP (ref 0–0.5)
EOSINOPHIL # BLD AUTO: 0.01 K/UL — SIGNIFICANT CHANGE UP (ref 0–0.5)
EOSINOPHIL # BLD AUTO: 0.03 K/UL — SIGNIFICANT CHANGE UP (ref 0–0.5)
EOSINOPHIL # BLD AUTO: 0.05 K/UL — SIGNIFICANT CHANGE UP (ref 0–0.5)
EOSINOPHIL NFR BLD AUTO: 0 % — SIGNIFICANT CHANGE UP (ref 0–6)
EOSINOPHIL NFR BLD AUTO: 0.1 % — SIGNIFICANT CHANGE UP (ref 0–6)
EOSINOPHIL NFR BLD AUTO: 0.1 % — SIGNIFICANT CHANGE UP (ref 0–6)
EOSINOPHIL NFR BLD AUTO: 0.3 % — SIGNIFICANT CHANGE UP (ref 0–6)
EOSINOPHIL NFR BLD AUTO: 0.5 % — SIGNIFICANT CHANGE UP (ref 0–6)
EPI CELLS # UR: ABNORMAL /HPF
ERYTHROCYTE [SEDIMENTATION RATE] IN BLOOD: 40 MM/HR — HIGH (ref 0–20)
FERRITIN SERPL-MCNC: 1017 NG/ML — HIGH (ref 30–400)
FERRITIN SERPL-MCNC: 1127 NG/ML — HIGH (ref 30–400)
FERRITIN SERPL-MCNC: 2618 NG/ML — HIGH (ref 30–400)
FLU A RESULT: SIGNIFICANT CHANGE UP
FLU A RESULT: SIGNIFICANT CHANGE UP
FLUAV AG NPH QL: SIGNIFICANT CHANGE UP
FLUBV AG NPH QL: SIGNIFICANT CHANGE UP
G6PD RBC-CCNC: 12.9 U/G HGB — SIGNIFICANT CHANGE UP (ref 7–20.5)
GAS PNL BLDA: SIGNIFICANT CHANGE UP
GLUCOSE BLDC GLUCOMTR-MCNC: 111 MG/DL — HIGH (ref 70–99)
GLUCOSE BLDC GLUCOMTR-MCNC: 118 MG/DL — HIGH (ref 70–99)
GLUCOSE BLDC GLUCOMTR-MCNC: 124 MG/DL — HIGH (ref 70–99)
GLUCOSE BLDC GLUCOMTR-MCNC: 159 MG/DL — HIGH (ref 70–99)
GLUCOSE BLDC GLUCOMTR-MCNC: 179 MG/DL — HIGH (ref 70–99)
GLUCOSE BLDC GLUCOMTR-MCNC: 182 MG/DL — HIGH (ref 70–99)
GLUCOSE BLDC GLUCOMTR-MCNC: 182 MG/DL — HIGH (ref 70–99)
GLUCOSE BLDC GLUCOMTR-MCNC: 186 MG/DL — HIGH (ref 70–99)
GLUCOSE BLDC GLUCOMTR-MCNC: 197 MG/DL — HIGH (ref 70–99)
GLUCOSE BLDC GLUCOMTR-MCNC: 204 MG/DL — HIGH (ref 70–99)
GLUCOSE BLDC GLUCOMTR-MCNC: 204 MG/DL — HIGH (ref 70–99)
GLUCOSE BLDC GLUCOMTR-MCNC: 216 MG/DL — HIGH (ref 70–99)
GLUCOSE BLDC GLUCOMTR-MCNC: 217 MG/DL — HIGH (ref 70–99)
GLUCOSE BLDC GLUCOMTR-MCNC: 221 MG/DL — HIGH (ref 70–99)
GLUCOSE BLDC GLUCOMTR-MCNC: 222 MG/DL — HIGH (ref 70–99)
GLUCOSE BLDC GLUCOMTR-MCNC: 231 MG/DL — HIGH (ref 70–99)
GLUCOSE BLDC GLUCOMTR-MCNC: 247 MG/DL — HIGH (ref 70–99)
GLUCOSE BLDC GLUCOMTR-MCNC: 294 MG/DL — HIGH (ref 70–99)
GLUCOSE BLDC GLUCOMTR-MCNC: 296 MG/DL — HIGH (ref 70–99)
GLUCOSE BLDC GLUCOMTR-MCNC: 298 MG/DL — HIGH (ref 70–99)
GLUCOSE BLDC GLUCOMTR-MCNC: 299 MG/DL — HIGH (ref 70–99)
GLUCOSE BLDC GLUCOMTR-MCNC: 307 MG/DL — HIGH (ref 70–99)
GLUCOSE BLDC GLUCOMTR-MCNC: 310 MG/DL — HIGH (ref 70–99)
GLUCOSE BLDC GLUCOMTR-MCNC: 316 MG/DL — HIGH (ref 70–99)
GLUCOSE BLDC GLUCOMTR-MCNC: 316 MG/DL — HIGH (ref 70–99)
GLUCOSE BLDC GLUCOMTR-MCNC: 341 MG/DL — HIGH (ref 70–99)
GLUCOSE BLDC GLUCOMTR-MCNC: 342 MG/DL — HIGH (ref 70–99)
GLUCOSE BLDC GLUCOMTR-MCNC: 348 MG/DL — HIGH (ref 70–99)
GLUCOSE BLDC GLUCOMTR-MCNC: 350 MG/DL — HIGH (ref 70–99)
GLUCOSE BLDC GLUCOMTR-MCNC: 362 MG/DL — HIGH (ref 70–99)
GLUCOSE BLDC GLUCOMTR-MCNC: 376 MG/DL — HIGH (ref 70–99)
GLUCOSE BLDC GLUCOMTR-MCNC: 380 MG/DL — HIGH (ref 70–99)
GLUCOSE BLDC GLUCOMTR-MCNC: 389 MG/DL — HIGH (ref 70–99)
GLUCOSE BLDC GLUCOMTR-MCNC: 398 MG/DL — HIGH (ref 70–99)
GLUCOSE BLDC GLUCOMTR-MCNC: 404 MG/DL — HIGH (ref 70–99)
GLUCOSE BLDC GLUCOMTR-MCNC: 93 MG/DL — SIGNIFICANT CHANGE UP (ref 70–99)
GLUCOSE SERPL-MCNC: 106 MG/DL — HIGH (ref 70–99)
GLUCOSE SERPL-MCNC: 150 MG/DL — HIGH (ref 70–99)
GLUCOSE SERPL-MCNC: 151 MG/DL — HIGH (ref 70–99)
GLUCOSE SERPL-MCNC: 203 MG/DL — HIGH (ref 70–99)
GLUCOSE SERPL-MCNC: 207 MG/DL — HIGH (ref 70–99)
GLUCOSE SERPL-MCNC: 221 MG/DL — HIGH (ref 70–99)
GLUCOSE SERPL-MCNC: 264 MG/DL — HIGH (ref 70–99)
GLUCOSE SERPL-MCNC: 284 MG/DL — HIGH (ref 70–99)
GLUCOSE SERPL-MCNC: 289 MG/DL — HIGH (ref 70–99)
GLUCOSE SERPL-MCNC: 291 MG/DL — HIGH (ref 70–99)
GLUCOSE SERPL-MCNC: 319 MG/DL — HIGH (ref 70–99)
GLUCOSE SERPL-MCNC: 343 MG/DL — HIGH (ref 70–99)
GLUCOSE SERPL-MCNC: 346 MG/DL — HIGH (ref 70–99)
GLUCOSE SERPL-MCNC: 351 MG/DL — HIGH (ref 70–99)
GLUCOSE SERPL-MCNC: 355 MG/DL — HIGH (ref 70–99)
GLUCOSE SERPL-MCNC: 362 MG/DL — HIGH (ref 70–99)
GLUCOSE SERPL-MCNC: 366 MG/DL — HIGH (ref 70–99)
GLUCOSE SERPL-MCNC: 383 MG/DL — HIGH (ref 70–99)
GLUCOSE SERPL-MCNC: 386 MG/DL — HIGH (ref 70–99)
GLUCOSE SERPL-MCNC: 431 MG/DL — HIGH (ref 70–99)
GLUCOSE SERPL-MCNC: 453 MG/DL — CRITICAL HIGH (ref 70–99)
GLUCOSE SERPL-MCNC: SIGNIFICANT CHANGE UP MG/DL (ref 70–99)
GLUCOSE UR QL: 1000 MG/DL
GRAN CASTS # UR COMP ASSIST: ABNORMAL /LPF
HBA1C BLD-MCNC: >15.5 % — HIGH (ref 4–5.6)
HCO3 BLDA-SCNC: 13 MMOL/L — LOW (ref 23–27)
HCO3 BLDA-SCNC: 20 MMOL/L — LOW (ref 23–27)
HCO3 BLDA-SCNC: 20 MMOL/L — LOW (ref 23–27)
HCO3 BLDA-SCNC: 22 MMOL/L — LOW (ref 23–27)
HCO3 BLDA-SCNC: 23 MMOL/L — SIGNIFICANT CHANGE UP (ref 21–29)
HCO3 BLDA-SCNC: 24 MMOL/L — SIGNIFICANT CHANGE UP (ref 21–29)
HCO3 BLDA-SCNC: 26 MMOL/L — SIGNIFICANT CHANGE UP (ref 21–29)
HCO3 BLDA-SCNC: 4 MMOL/L — LOW (ref 23–27)
HCO3 BLDA-SCNC: 4 MMOL/L — LOW (ref 23–27)
HCO3 BLDA-SCNC: 9 MMOL/L — LOW (ref 23–27)
HCT VFR BLD CALC: 39.7 % — SIGNIFICANT CHANGE UP (ref 39–50)
HCT VFR BLD CALC: 40.3 % — SIGNIFICANT CHANGE UP (ref 39–50)
HCT VFR BLD CALC: 41.9 % — SIGNIFICANT CHANGE UP (ref 39–50)
HCT VFR BLD CALC: 42.7 % — SIGNIFICANT CHANGE UP (ref 39–50)
HCT VFR BLD CALC: 43.2 % — SIGNIFICANT CHANGE UP (ref 39–50)
HCT VFR BLD CALC: 45.5 % — SIGNIFICANT CHANGE UP (ref 39–50)
HCT VFR BLD CALC: 46.7 % — SIGNIFICANT CHANGE UP (ref 39–50)
HCT VFR BLD CALC: 47 % — SIGNIFICANT CHANGE UP (ref 39–50)
HCT VFR BLD CALC: 49 % — SIGNIFICANT CHANGE UP (ref 39–50)
HCT VFR BLD CALC: 49.6 % — SIGNIFICANT CHANGE UP (ref 39–50)
HCT VFR BLD CALC: 53 % — HIGH (ref 39–50)
HCV AB S/CO SERPL IA: 0.16 S/CO — SIGNIFICANT CHANGE UP (ref 0–0.99)
HCV AB SERPL-IMP: SIGNIFICANT CHANGE UP
HGB BLD-MCNC: 12.7 G/DL — LOW (ref 13–17)
HGB BLD-MCNC: 12.9 G/DL — LOW (ref 13–17)
HGB BLD-MCNC: 13.7 G/DL — SIGNIFICANT CHANGE UP (ref 13–17)
HGB BLD-MCNC: 14.2 G/DL — SIGNIFICANT CHANGE UP (ref 13–17)
HGB BLD-MCNC: 14.5 G/DL — SIGNIFICANT CHANGE UP (ref 13–17)
HGB BLD-MCNC: 15 G/DL — SIGNIFICANT CHANGE UP (ref 13–17)
HGB BLD-MCNC: 15.4 G/DL — SIGNIFICANT CHANGE UP (ref 13–17)
HGB BLD-MCNC: 16 G/DL — SIGNIFICANT CHANGE UP (ref 13–17)
HGB BLD-MCNC: 16 G/DL — SIGNIFICANT CHANGE UP (ref 13–17)
HGB BLD-MCNC: 16.1 G/DL — SIGNIFICANT CHANGE UP (ref 13–17)
HGB BLD-MCNC: 17.2 G/DL — HIGH (ref 13–17)
HOROWITZ INDEX BLDA+IHG-RTO: 100 — SIGNIFICANT CHANGE UP
HOROWITZ INDEX BLDA+IHG-RTO: 21 — SIGNIFICANT CHANGE UP
HOROWITZ INDEX BLDA+IHG-RTO: 50 — SIGNIFICANT CHANGE UP
HYPOCHROMIA BLD QL: SLIGHT — SIGNIFICANT CHANGE UP
IL10 SERPL-MCNC: 28 PG/ML — HIGH
IL12 SERPL-MCNC: <5 PG/ML — SIGNIFICANT CHANGE UP
IL13 SERPL-MCNC: <5 PG/ML — SIGNIFICANT CHANGE UP
IL2 SERPL-MCNC: 10 PG/ML — SIGNIFICANT CHANGE UP
IL2 SERPL-MCNC: 811 PG/ML — SIGNIFICANT CHANGE UP
IL4 SERPL-MCNC: <5 PG/ML — SIGNIFICANT CHANGE UP
IL6 SERPL-MCNC: 18 PG/ML — HIGH
IL8 SERPL-MCNC: <5 PG/ML — SIGNIFICANT CHANGE UP
IMM GRANULOCYTES NFR BLD AUTO: 0.4 % — SIGNIFICANT CHANGE UP (ref 0–1.5)
IMM GRANULOCYTES NFR BLD AUTO: 0.6 % — SIGNIFICANT CHANGE UP (ref 0–1.5)
IMM GRANULOCYTES NFR BLD AUTO: 0.7 % — SIGNIFICANT CHANGE UP (ref 0–1.5)
IMM GRANULOCYTES NFR BLD AUTO: 0.8 % — SIGNIFICANT CHANGE UP (ref 0–1.5)
IMM GRANULOCYTES NFR BLD AUTO: 0.8 % — SIGNIFICANT CHANGE UP (ref 0–1.5)
IMM GRANULOCYTES NFR BLD AUTO: 0.9 % — SIGNIFICANT CHANGE UP (ref 0–1.5)
IMM GRANULOCYTES NFR BLD AUTO: 1.1 % — SIGNIFICANT CHANGE UP (ref 0–1.5)
IMM GRANULOCYTES NFR BLD AUTO: 1.3 % — SIGNIFICANT CHANGE UP (ref 0–1.5)
INR BLD: 1.03 RATIO — SIGNIFICANT CHANGE UP (ref 0.88–1.16)
INTERFERON GAMMA: 6 PG/ML — HIGH
INTERLEUKIN 1 BETA: 8 PG/ML — SIGNIFICANT CHANGE UP
INTERLEUKIN 17: <5 PG/ML — SIGNIFICANT CHANGE UP
INTERLEUKIN 5: <5 PG/ML — SIGNIFICANT CHANGE UP
KETONES UR-MCNC: ABNORMAL
LACTATE SERPL-SCNC: 2.6 MMOL/L — HIGH (ref 0.7–2)
LACTATE SERPL-SCNC: 3.3 MMOL/L — HIGH (ref 0.7–2)
LACTATE SERPL-SCNC: 4 MMOL/L — CRITICAL HIGH (ref 0.7–2)
LDH SERPL L TO P-CCNC: 531 U/L — HIGH (ref 120–225)
LDH SERPL L TO P-CCNC: SIGNIFICANT CHANGE UP U/L (ref 120–225)
LEUKOCYTE ESTERASE UR-ACNC: NEGATIVE — SIGNIFICANT CHANGE UP
LYMPHOCYTES # BLD AUTO: 1.21 K/UL — SIGNIFICANT CHANGE UP (ref 1–3.3)
LYMPHOCYTES # BLD AUTO: 1.52 K/UL — SIGNIFICANT CHANGE UP (ref 1–3.3)
LYMPHOCYTES # BLD AUTO: 1.64 K/UL — SIGNIFICANT CHANGE UP (ref 1–3.3)
LYMPHOCYTES # BLD AUTO: 1.91 K/UL — SIGNIFICANT CHANGE UP (ref 1–3.3)
LYMPHOCYTES # BLD AUTO: 1.93 K/UL — SIGNIFICANT CHANGE UP (ref 1–3.3)
LYMPHOCYTES # BLD AUTO: 1.93 K/UL — SIGNIFICANT CHANGE UP (ref 1–3.3)
LYMPHOCYTES # BLD AUTO: 10.4 % — LOW (ref 13–44)
LYMPHOCYTES # BLD AUTO: 12.1 % — LOW (ref 13–44)
LYMPHOCYTES # BLD AUTO: 16.6 % — SIGNIFICANT CHANGE UP (ref 13–44)
LYMPHOCYTES # BLD AUTO: 2.04 K/UL — SIGNIFICANT CHANGE UP (ref 1–3.3)
LYMPHOCYTES # BLD AUTO: 2.81 K/UL — SIGNIFICANT CHANGE UP (ref 1–3.3)
LYMPHOCYTES # BLD AUTO: 21.1 % — SIGNIFICANT CHANGE UP (ref 13–44)
LYMPHOCYTES # BLD AUTO: 21.5 % — SIGNIFICANT CHANGE UP (ref 13–44)
LYMPHOCYTES # BLD AUTO: 23.5 % — SIGNIFICANT CHANGE UP (ref 13–44)
LYMPHOCYTES # BLD AUTO: 24.9 % — SIGNIFICANT CHANGE UP (ref 13–44)
LYMPHOCYTES # BLD AUTO: 29.5 % — SIGNIFICANT CHANGE UP (ref 13–44)
MAGNESIUM SERPL-MCNC: 2.1 MG/DL — SIGNIFICANT CHANGE UP (ref 1.6–2.6)
MAGNESIUM SERPL-MCNC: 2.2 MG/DL — SIGNIFICANT CHANGE UP (ref 1.6–2.6)
MAGNESIUM SERPL-MCNC: 2.3 MG/DL — SIGNIFICANT CHANGE UP (ref 1.6–2.6)
MAGNESIUM SERPL-MCNC: 2.4 MG/DL — SIGNIFICANT CHANGE UP (ref 1.6–2.6)
MAGNESIUM SERPL-MCNC: 2.5 MG/DL — SIGNIFICANT CHANGE UP (ref 1.6–2.6)
MAGNESIUM SERPL-MCNC: SIGNIFICANT CHANGE UP MG/DL (ref 1.6–2.6)
MANUAL SMEAR VERIFICATION: SIGNIFICANT CHANGE UP
MCHC RBC-ENTMCNC: 28.4 PG — SIGNIFICANT CHANGE UP (ref 27–34)
MCHC RBC-ENTMCNC: 28.7 PG — SIGNIFICANT CHANGE UP (ref 27–34)
MCHC RBC-ENTMCNC: 28.8 PG — SIGNIFICANT CHANGE UP (ref 27–34)
MCHC RBC-ENTMCNC: 28.9 PG — SIGNIFICANT CHANGE UP (ref 27–34)
MCHC RBC-ENTMCNC: 29 PG — SIGNIFICANT CHANGE UP (ref 27–34)
MCHC RBC-ENTMCNC: 29 PG — SIGNIFICANT CHANGE UP (ref 27–34)
MCHC RBC-ENTMCNC: 29.1 PG — SIGNIFICANT CHANGE UP (ref 27–34)
MCHC RBC-ENTMCNC: 29.1 PG — SIGNIFICANT CHANGE UP (ref 27–34)
MCHC RBC-ENTMCNC: 29.2 PG — SIGNIFICANT CHANGE UP (ref 27–34)
MCHC RBC-ENTMCNC: 29.2 PG — SIGNIFICANT CHANGE UP (ref 27–34)
MCHC RBC-ENTMCNC: 29.4 PG — SIGNIFICANT CHANGE UP (ref 27–34)
MCHC RBC-ENTMCNC: 31.7 GM/DL — LOW (ref 32–36)
MCHC RBC-ENTMCNC: 32 GM/DL — SIGNIFICANT CHANGE UP (ref 32–36)
MCHC RBC-ENTMCNC: 32 GM/DL — SIGNIFICANT CHANGE UP (ref 32–36)
MCHC RBC-ENTMCNC: 32.3 GM/DL — SIGNIFICANT CHANGE UP (ref 32–36)
MCHC RBC-ENTMCNC: 32.5 GM/DL — SIGNIFICANT CHANGE UP (ref 32–36)
MCHC RBC-ENTMCNC: 32.9 GM/DL — SIGNIFICANT CHANGE UP (ref 32–36)
MCHC RBC-ENTMCNC: 33 GM/DL — SIGNIFICANT CHANGE UP (ref 32–36)
MCHC RBC-ENTMCNC: 33 GM/DL — SIGNIFICANT CHANGE UP (ref 32–36)
MCHC RBC-ENTMCNC: 33.9 GM/DL — SIGNIFICANT CHANGE UP (ref 32–36)
MCHC RBC-ENTMCNC: 34 GM/DL — SIGNIFICANT CHANGE UP (ref 32–36)
MCHC RBC-ENTMCNC: 34 GM/DL — SIGNIFICANT CHANGE UP (ref 32–36)
MCV RBC AUTO: 85.3 FL — SIGNIFICANT CHANGE UP (ref 80–100)
MCV RBC AUTO: 85.7 FL — SIGNIFICANT CHANGE UP (ref 80–100)
MCV RBC AUTO: 86.2 FL — SIGNIFICANT CHANGE UP (ref 80–100)
MCV RBC AUTO: 87.9 FL — SIGNIFICANT CHANGE UP (ref 80–100)
MCV RBC AUTO: 87.9 FL — SIGNIFICANT CHANGE UP (ref 80–100)
MCV RBC AUTO: 88.6 FL — SIGNIFICANT CHANGE UP (ref 80–100)
MCV RBC AUTO: 88.7 FL — SIGNIFICANT CHANGE UP (ref 80–100)
MCV RBC AUTO: 88.9 FL — SIGNIFICANT CHANGE UP (ref 80–100)
MCV RBC AUTO: 90 FL — SIGNIFICANT CHANGE UP (ref 80–100)
MCV RBC AUTO: 90.6 FL — SIGNIFICANT CHANGE UP (ref 80–100)
MCV RBC AUTO: 91.8 FL — SIGNIFICANT CHANGE UP (ref 80–100)
MONOCYTES # BLD AUTO: 0.19 K/UL — SIGNIFICANT CHANGE UP (ref 0–0.9)
MONOCYTES # BLD AUTO: 0.22 K/UL — SIGNIFICANT CHANGE UP (ref 0–0.9)
MONOCYTES # BLD AUTO: 0.26 K/UL — SIGNIFICANT CHANGE UP (ref 0–0.9)
MONOCYTES # BLD AUTO: 0.36 K/UL — SIGNIFICANT CHANGE UP (ref 0–0.9)
MONOCYTES # BLD AUTO: 0.36 K/UL — SIGNIFICANT CHANGE UP (ref 0–0.9)
MONOCYTES # BLD AUTO: 0.45 K/UL — SIGNIFICANT CHANGE UP (ref 0–0.9)
MONOCYTES # BLD AUTO: 0.49 K/UL — SIGNIFICANT CHANGE UP (ref 0–0.9)
MONOCYTES # BLD AUTO: 0.51 K/UL — SIGNIFICANT CHANGE UP (ref 0–0.9)
MONOCYTES NFR BLD AUTO: 1.4 % — LOW (ref 2–14)
MONOCYTES NFR BLD AUTO: 1.8 % — LOW (ref 2–14)
MONOCYTES NFR BLD AUTO: 3.1 % — SIGNIFICANT CHANGE UP (ref 2–14)
MONOCYTES NFR BLD AUTO: 3.6 % — SIGNIFICANT CHANGE UP (ref 2–14)
MONOCYTES NFR BLD AUTO: 3.8 % — SIGNIFICANT CHANGE UP (ref 2–14)
MONOCYTES NFR BLD AUTO: 5.5 % — SIGNIFICANT CHANGE UP (ref 2–14)
MONOCYTES NFR BLD AUTO: 5.7 % — SIGNIFICANT CHANGE UP (ref 2–14)
MONOCYTES NFR BLD AUTO: 6.4 % — SIGNIFICANT CHANGE UP (ref 2–14)
NEUTROPHILS # BLD AUTO: 11.53 K/UL — HIGH (ref 1.8–7.4)
NEUTROPHILS # BLD AUTO: 5.22 K/UL — SIGNIFICANT CHANGE UP (ref 1.8–7.4)
NEUTROPHILS # BLD AUTO: 5.33 K/UL — SIGNIFICANT CHANGE UP (ref 1.8–7.4)
NEUTROPHILS # BLD AUTO: 5.75 K/UL — SIGNIFICANT CHANGE UP (ref 1.8–7.4)
NEUTROPHILS # BLD AUTO: 6.22 K/UL — SIGNIFICANT CHANGE UP (ref 1.8–7.4)
NEUTROPHILS # BLD AUTO: 6.42 K/UL — SIGNIFICANT CHANGE UP (ref 1.8–7.4)
NEUTROPHILS # BLD AUTO: 9.92 K/UL — HIGH (ref 1.8–7.4)
NEUTROPHILS # BLD AUTO: 9.94 K/UL — HIGH (ref 1.8–7.4)
NEUTROPHILS NFR BLD AUTO: 65.3 % — SIGNIFICANT CHANGE UP (ref 43–77)
NEUTROPHILS NFR BLD AUTO: 67.9 % — SIGNIFICANT CHANGE UP (ref 43–77)
NEUTROPHILS NFR BLD AUTO: 69.9 % — SIGNIFICANT CHANGE UP (ref 43–77)
NEUTROPHILS NFR BLD AUTO: 71.5 % — SIGNIFICANT CHANGE UP (ref 43–77)
NEUTROPHILS NFR BLD AUTO: 74.2 % — SIGNIFICANT CHANGE UP (ref 43–77)
NEUTROPHILS NFR BLD AUTO: 80.5 % — HIGH (ref 43–77)
NEUTROPHILS NFR BLD AUTO: 84.9 % — HIGH (ref 43–77)
NEUTROPHILS NFR BLD AUTO: 85.1 % — HIGH (ref 43–77)
NITRITE UR-MCNC: NEGATIVE — SIGNIFICANT CHANGE UP
NRBC # BLD: 0 /100 WBCS — SIGNIFICANT CHANGE UP (ref 0–0)
NT-PROBNP SERPL-SCNC: 147 PG/ML — HIGH (ref 0–125)
PCO2 BLDA: 10 MMHG — LOW (ref 32–46)
PCO2 BLDA: 11 MMHG — LOW (ref 32–46)
PCO2 BLDA: 17 MMHG — LOW (ref 32–46)
PCO2 BLDA: 20 MMHG — LOW (ref 32–46)
PCO2 BLDA: 27 MMHG — LOW (ref 32–46)
PCO2 BLDA: 28 MMHG — LOW (ref 32–46)
PCO2 BLDA: 37 MMHG — SIGNIFICANT CHANGE UP (ref 32–46)
PCO2 BLDA: 37 MMHG — SIGNIFICANT CHANGE UP (ref 32–46)
PCO2 BLDA: 43 MMHG — SIGNIFICANT CHANGE UP (ref 32–46)
PCO2 BLDA: 59 MMHG — HIGH (ref 32–46)
PH BLDA: 7.21 — LOW (ref 7.35–7.45)
PH BLDA: 7.23 — LOW (ref 7.35–7.45)
PH BLDA: 7.26 — LOW (ref 7.35–7.45)
PH BLDA: 7.35 — SIGNIFICANT CHANGE UP (ref 7.35–7.45)
PH BLDA: 7.37 — SIGNIFICANT CHANGE UP (ref 7.35–7.45)
PH BLDA: 7.39 — SIGNIFICANT CHANGE UP (ref 7.35–7.45)
PH BLDA: 7.41 — SIGNIFICANT CHANGE UP (ref 7.35–7.45)
PH BLDA: 7.43 — SIGNIFICANT CHANGE UP (ref 7.35–7.45)
PH BLDA: 7.46 — HIGH (ref 7.35–7.45)
PH BLDA: 7.49 — HIGH (ref 7.35–7.45)
PH UR: 5 — SIGNIFICANT CHANGE UP (ref 5–8)
PHOSPHATE SERPL-MCNC: 0.5 MG/DL — CRITICAL LOW (ref 2.5–4.5)
PHOSPHATE SERPL-MCNC: 0.8 MG/DL — CRITICAL LOW (ref 2.5–4.5)
PHOSPHATE SERPL-MCNC: 1.6 MG/DL — LOW (ref 2.5–4.5)
PHOSPHATE SERPL-MCNC: 2.3 MG/DL — LOW (ref 2.5–4.5)
PHOSPHATE SERPL-MCNC: 2.5 MG/DL — SIGNIFICANT CHANGE UP (ref 2.5–4.5)
PHOSPHATE SERPL-MCNC: 2.6 MG/DL — SIGNIFICANT CHANGE UP (ref 2.5–4.5)
PHOSPHATE SERPL-MCNC: 3.3 MG/DL — SIGNIFICANT CHANGE UP (ref 2.5–4.5)
PHOSPHATE SERPL-MCNC: 3.4 MG/DL — SIGNIFICANT CHANGE UP (ref 2.5–4.5)
PHOSPHATE SERPL-MCNC: 4.4 MG/DL — SIGNIFICANT CHANGE UP (ref 2.5–4.5)
PHOSPHATE SERPL-MCNC: SIGNIFICANT CHANGE UP MG/DL (ref 2.5–4.5)
PLAT MORPH BLD: NORMAL — SIGNIFICANT CHANGE UP
PLATELET # BLD AUTO: 152 K/UL — SIGNIFICANT CHANGE UP (ref 150–400)
PLATELET # BLD AUTO: 182 K/UL — SIGNIFICANT CHANGE UP (ref 150–400)
PLATELET # BLD AUTO: 189 K/UL — SIGNIFICANT CHANGE UP (ref 150–400)
PLATELET # BLD AUTO: 222 K/UL — SIGNIFICANT CHANGE UP (ref 150–400)
PLATELET # BLD AUTO: 239 K/UL — SIGNIFICANT CHANGE UP (ref 150–400)
PLATELET # BLD AUTO: 244 K/UL — SIGNIFICANT CHANGE UP (ref 150–400)
PLATELET # BLD AUTO: 297 K/UL — SIGNIFICANT CHANGE UP (ref 150–400)
PLATELET # BLD AUTO: 300 K/UL — SIGNIFICANT CHANGE UP (ref 150–400)
PLATELET # BLD AUTO: 310 K/UL — SIGNIFICANT CHANGE UP (ref 150–400)
PLATELET # BLD AUTO: 322 K/UL — SIGNIFICANT CHANGE UP (ref 150–400)
PLATELET # BLD AUTO: 370 K/UL — SIGNIFICANT CHANGE UP (ref 150–400)
PLATELET COUNT - ESTIMATE: NORMAL — SIGNIFICANT CHANGE UP
PO2 BLDA: 101 MMHG — SIGNIFICANT CHANGE UP (ref 74–108)
PO2 BLDA: 113 MMHG — HIGH (ref 74–108)
PO2 BLDA: 55 MMHG — LOW (ref 74–108)
PO2 BLDA: 58 MMHG — LOW (ref 74–108)
PO2 BLDA: 59 MMHG — LOW (ref 74–108)
PO2 BLDA: 68 MMHG — LOW (ref 74–108)
PO2 BLDA: 86 MMHG — SIGNIFICANT CHANGE UP (ref 74–108)
PO2 BLDA: 93 MMHG — SIGNIFICANT CHANGE UP (ref 74–108)
PO2 BLDA: 95 MMHG — SIGNIFICANT CHANGE UP (ref 74–108)
PO2 BLDA: 96 MMHG — SIGNIFICANT CHANGE UP (ref 74–108)
POIKILOCYTOSIS BLD QL AUTO: SLIGHT — SIGNIFICANT CHANGE UP
POLYCHROMASIA BLD QL SMEAR: SLIGHT — SIGNIFICANT CHANGE UP
POTASSIUM SERPL-MCNC: 2.8 MMOL/L — CRITICAL LOW (ref 3.5–5.3)
POTASSIUM SERPL-MCNC: 2.8 MMOL/L — CRITICAL LOW (ref 3.5–5.3)
POTASSIUM SERPL-MCNC: 3 MMOL/L — LOW (ref 3.5–5.3)
POTASSIUM SERPL-MCNC: 3 MMOL/L — LOW (ref 3.5–5.3)
POTASSIUM SERPL-MCNC: 3.1 MMOL/L — LOW (ref 3.5–5.3)
POTASSIUM SERPL-MCNC: 3.1 MMOL/L — LOW (ref 3.5–5.3)
POTASSIUM SERPL-MCNC: 3.2 MMOL/L — LOW (ref 3.5–5.3)
POTASSIUM SERPL-MCNC: 3.2 MMOL/L — LOW (ref 3.5–5.3)
POTASSIUM SERPL-MCNC: 3.3 MMOL/L — LOW (ref 3.5–5.3)
POTASSIUM SERPL-MCNC: 3.7 MMOL/L — SIGNIFICANT CHANGE UP (ref 3.5–5.3)
POTASSIUM SERPL-MCNC: 3.8 MMOL/L — SIGNIFICANT CHANGE UP (ref 3.5–5.3)
POTASSIUM SERPL-MCNC: 3.8 MMOL/L — SIGNIFICANT CHANGE UP (ref 3.5–5.3)
POTASSIUM SERPL-MCNC: 3.9 MMOL/L — SIGNIFICANT CHANGE UP (ref 3.5–5.3)
POTASSIUM SERPL-MCNC: 3.9 MMOL/L — SIGNIFICANT CHANGE UP (ref 3.5–5.3)
POTASSIUM SERPL-MCNC: 4.1 MMOL/L — SIGNIFICANT CHANGE UP (ref 3.5–5.3)
POTASSIUM SERPL-MCNC: 4.1 MMOL/L — SIGNIFICANT CHANGE UP (ref 3.5–5.3)
POTASSIUM SERPL-MCNC: 4.2 MMOL/L — SIGNIFICANT CHANGE UP (ref 3.5–5.3)
POTASSIUM SERPL-MCNC: SIGNIFICANT CHANGE UP MMOL/L (ref 3.5–5.3)
POTASSIUM SERPL-SCNC: 2.8 MMOL/L — CRITICAL LOW (ref 3.5–5.3)
POTASSIUM SERPL-SCNC: 2.8 MMOL/L — CRITICAL LOW (ref 3.5–5.3)
POTASSIUM SERPL-SCNC: 3 MMOL/L — LOW (ref 3.5–5.3)
POTASSIUM SERPL-SCNC: 3 MMOL/L — LOW (ref 3.5–5.3)
POTASSIUM SERPL-SCNC: 3.1 MMOL/L — LOW (ref 3.5–5.3)
POTASSIUM SERPL-SCNC: 3.1 MMOL/L — LOW (ref 3.5–5.3)
POTASSIUM SERPL-SCNC: 3.2 MMOL/L — LOW (ref 3.5–5.3)
POTASSIUM SERPL-SCNC: 3.2 MMOL/L — LOW (ref 3.5–5.3)
POTASSIUM SERPL-SCNC: 3.3 MMOL/L — LOW (ref 3.5–5.3)
POTASSIUM SERPL-SCNC: 3.7 MMOL/L — SIGNIFICANT CHANGE UP (ref 3.5–5.3)
POTASSIUM SERPL-SCNC: 3.8 MMOL/L — SIGNIFICANT CHANGE UP (ref 3.5–5.3)
POTASSIUM SERPL-SCNC: 3.8 MMOL/L — SIGNIFICANT CHANGE UP (ref 3.5–5.3)
POTASSIUM SERPL-SCNC: 3.9 MMOL/L — SIGNIFICANT CHANGE UP (ref 3.5–5.3)
POTASSIUM SERPL-SCNC: 3.9 MMOL/L — SIGNIFICANT CHANGE UP (ref 3.5–5.3)
POTASSIUM SERPL-SCNC: 4.1 MMOL/L — SIGNIFICANT CHANGE UP (ref 3.5–5.3)
POTASSIUM SERPL-SCNC: 4.1 MMOL/L — SIGNIFICANT CHANGE UP (ref 3.5–5.3)
POTASSIUM SERPL-SCNC: 4.2 MMOL/L — SIGNIFICANT CHANGE UP (ref 3.5–5.3)
POTASSIUM SERPL-SCNC: SIGNIFICANT CHANGE UP MMOL/L (ref 3.5–5.3)
PROCALCITONIN SERPL-MCNC: 0.34 NG/ML — HIGH (ref 0.02–0.1)
PROT SERPL-MCNC: 5.8 GM/DL — LOW (ref 6–8.3)
PROT SERPL-MCNC: 6.2 G/DL — SIGNIFICANT CHANGE UP (ref 6–8.3)
PROT SERPL-MCNC: 6.3 G/DL — SIGNIFICANT CHANGE UP (ref 6–8.3)
PROT SERPL-MCNC: 7.2 G/DL — SIGNIFICANT CHANGE UP (ref 6–8.3)
PROT SERPL-MCNC: 7.8 G/DL — SIGNIFICANT CHANGE UP (ref 6–8.3)
PROT SERPL-MCNC: 7.9 G/DL — SIGNIFICANT CHANGE UP (ref 6–8.3)
PROT SERPL-MCNC: 9.5 G/DL — HIGH (ref 6–8.3)
PROT SERPL-MCNC: SIGNIFICANT CHANGE UP G/DL (ref 6–8.3)
PROT UR-MCNC: 100
PROTHROM AB SERPL-ACNC: 11.6 SEC — SIGNIFICANT CHANGE UP (ref 10–12.9)
RAPID RVP RESULT: SIGNIFICANT CHANGE UP
RBC # BLD: 4.39 M/UL — SIGNIFICANT CHANGE UP (ref 4.2–5.8)
RBC # BLD: 4.41 M/UL — SIGNIFICANT CHANGE UP (ref 4.2–5.8)
RBC # BLD: 4.77 M/UL — SIGNIFICANT CHANGE UP (ref 4.2–5.8)
RBC # BLD: 4.86 M/UL — SIGNIFICANT CHANGE UP (ref 4.2–5.8)
RBC # BLD: 4.98 M/UL — SIGNIFICANT CHANGE UP (ref 4.2–5.8)
RBC # BLD: 5.13 M/UL — SIGNIFICANT CHANGE UP (ref 4.2–5.8)
RBC # BLD: 5.31 M/UL — SIGNIFICANT CHANGE UP (ref 4.2–5.8)
RBC # BLD: 5.51 M/UL — SIGNIFICANT CHANGE UP (ref 4.2–5.8)
RBC # BLD: 5.53 M/UL — SIGNIFICANT CHANGE UP (ref 4.2–5.8)
RBC # BLD: 5.64 M/UL — SIGNIFICANT CHANGE UP (ref 4.2–5.8)
RBC # BLD: 5.96 M/UL — HIGH (ref 4.2–5.8)
RBC # FLD: 13.1 % — SIGNIFICANT CHANGE UP (ref 10.3–14.5)
RBC # FLD: 13.2 % — SIGNIFICANT CHANGE UP (ref 10.3–14.5)
RBC # FLD: 13.3 % — SIGNIFICANT CHANGE UP (ref 10.3–14.5)
RBC # FLD: 14 % — SIGNIFICANT CHANGE UP (ref 10.3–14.5)
RBC # FLD: 14.1 % — SIGNIFICANT CHANGE UP (ref 10.3–14.5)
RBC # FLD: 14.1 % — SIGNIFICANT CHANGE UP (ref 10.3–14.5)
RBC # FLD: 14.3 % — SIGNIFICANT CHANGE UP (ref 10.3–14.5)
RBC # FLD: 14.4 % — SIGNIFICANT CHANGE UP (ref 10.3–14.5)
RBC # FLD: 14.6 % — HIGH (ref 10.3–14.5)
RBC BLD AUTO: ABNORMAL
RBC CASTS # UR COMP ASSIST: ABNORMAL /HPF (ref 0–2)
RSV RESULT: SIGNIFICANT CHANGE UP
RSV RNA RESP QL NAA+PROBE: SIGNIFICANT CHANGE UP
SAO2 % BLDA: 88 % — LOW (ref 92–96)
SAO2 % BLDA: 91 % — LOW (ref 92–96)
SAO2 % BLDA: 91 % — LOW (ref 92–96)
SAO2 % BLDA: 93 % — SIGNIFICANT CHANGE UP (ref 92–96)
SAO2 % BLDA: 95 % — SIGNIFICANT CHANGE UP (ref 92–96)
SAO2 % BLDA: 95 % — SIGNIFICANT CHANGE UP (ref 92–96)
SAO2 % BLDA: 96 % — SIGNIFICANT CHANGE UP (ref 92–96)
SAO2 % BLDA: 96 % — SIGNIFICANT CHANGE UP (ref 92–96)
SAO2 % BLDA: 97 % — HIGH (ref 92–96)
SAO2 % BLDA: 98 % — HIGH (ref 92–96)
SARS-COV-2 RNA SPEC QL NAA+PROBE: SIGNIFICANT CHANGE UP
SARS-COV-2 RNA SPEC QL NAA+PROBE: SIGNIFICANT CHANGE UP
SODIUM SERPL-SCNC: 134 MMOL/L — LOW (ref 135–145)
SODIUM SERPL-SCNC: 143 MMOL/L — SIGNIFICANT CHANGE UP (ref 135–145)
SODIUM SERPL-SCNC: 149 MMOL/L — HIGH (ref 135–145)
SODIUM SERPL-SCNC: 150 MMOL/L — HIGH (ref 135–145)
SODIUM SERPL-SCNC: 151 MMOL/L — HIGH (ref 135–145)
SODIUM SERPL-SCNC: 152 MMOL/L — HIGH (ref 135–145)
SODIUM SERPL-SCNC: 153 MMOL/L — HIGH (ref 135–145)
SODIUM SERPL-SCNC: 155 MMOL/L — HIGH (ref 135–145)
SODIUM SERPL-SCNC: 158 MMOL/L — HIGH (ref 135–145)
SODIUM SERPL-SCNC: 159 MMOL/L — HIGH (ref 135–145)
SODIUM SERPL-SCNC: 159 MMOL/L — HIGH (ref 135–145)
SODIUM SERPL-SCNC: 160 MMOL/L — CRITICAL HIGH (ref 135–145)
SODIUM SERPL-SCNC: 161 MMOL/L — CRITICAL HIGH (ref 135–145)
SODIUM SERPL-SCNC: 163 MMOL/L — CRITICAL HIGH (ref 135–145)
SODIUM SERPL-SCNC: 164 MMOL/L — CRITICAL HIGH (ref 135–145)
SODIUM SERPL-SCNC: 165 MMOL/L — CRITICAL HIGH (ref 135–145)
SODIUM SERPL-SCNC: SIGNIFICANT CHANGE UP MMOL/L (ref 135–145)
SP GR SPEC: 1.02 — SIGNIFICANT CHANGE UP (ref 1.01–1.02)
SPECIMEN SOURCE: SIGNIFICANT CHANGE UP
T-CELL CD4 SUBSET PNL BLD: 257 /UL — LOW (ref 489–1457)
T-CELL CD4 SUBSET PNL BLD: 370 /UL — LOW (ref 489–1457)
TOXIC GRANULES BLD QL SMEAR: PRESENT — SIGNIFICANT CHANGE UP
TROPONIN I SERPL-MCNC: 0.04 NG/ML — SIGNIFICANT CHANGE UP (ref 0–0.04)
TROPONIN I SERPL-MCNC: <0.015 NG/ML — SIGNIFICANT CHANGE UP (ref 0–0.04)
TROPONIN I SERPL-MCNC: SIGNIFICANT CHANGE UP NG/ML (ref 0–0.04)
TSH SERPL-MCNC: SIGNIFICANT CHANGE UP UU/ML (ref 0.34–4.82)
UROBILINOGEN FLD QL: NEGATIVE — SIGNIFICANT CHANGE UP
WBC # BLD: 10.26 K/UL — SIGNIFICANT CHANGE UP (ref 3.8–10.5)
WBC # BLD: 10.51 K/UL — HIGH (ref 3.8–10.5)
WBC # BLD: 11.67 K/UL — HIGH (ref 3.8–10.5)
WBC # BLD: 12.32 K/UL — HIGH (ref 3.8–10.5)
WBC # BLD: 12.71 K/UL — HIGH (ref 3.8–10.5)
WBC # BLD: 13.57 K/UL — HIGH (ref 3.8–10.5)
WBC # BLD: 7.19 K/UL — SIGNIFICANT CHANGE UP (ref 3.8–10.5)
WBC # BLD: 7.68 K/UL — SIGNIFICANT CHANGE UP (ref 3.8–10.5)
WBC # BLD: 8.22 K/UL — SIGNIFICANT CHANGE UP (ref 3.8–10.5)
WBC # BLD: 8.98 K/UL — SIGNIFICANT CHANGE UP (ref 3.8–10.5)
WBC # BLD: 9.53 K/UL — SIGNIFICANT CHANGE UP (ref 3.8–10.5)
WBC # FLD AUTO: 10.26 K/UL — SIGNIFICANT CHANGE UP (ref 3.8–10.5)
WBC # FLD AUTO: 10.51 K/UL — HIGH (ref 3.8–10.5)
WBC # FLD AUTO: 11.67 K/UL — HIGH (ref 3.8–10.5)
WBC # FLD AUTO: 12.32 K/UL — HIGH (ref 3.8–10.5)
WBC # FLD AUTO: 12.71 K/UL — HIGH (ref 3.8–10.5)
WBC # FLD AUTO: 13.57 K/UL — HIGH (ref 3.8–10.5)
WBC # FLD AUTO: 7.19 K/UL — SIGNIFICANT CHANGE UP (ref 3.8–10.5)
WBC # FLD AUTO: 7.68 K/UL — SIGNIFICANT CHANGE UP (ref 3.8–10.5)
WBC # FLD AUTO: 8.22 K/UL — SIGNIFICANT CHANGE UP (ref 3.8–10.5)
WBC # FLD AUTO: 8.98 K/UL — SIGNIFICANT CHANGE UP (ref 3.8–10.5)
WBC # FLD AUTO: 9.53 K/UL — SIGNIFICANT CHANGE UP (ref 3.8–10.5)
WBC UR QL: SIGNIFICANT CHANGE UP /HPF (ref 0–5)

## 2020-01-01 PROCEDURE — 84100 ASSAY OF PHOSPHORUS: CPT

## 2020-01-01 PROCEDURE — 99291 CRITICAL CARE FIRST HOUR: CPT

## 2020-01-01 PROCEDURE — 82803 BLOOD GASES ANY COMBINATION: CPT

## 2020-01-01 PROCEDURE — 87631 RESP VIRUS 3-5 TARGETS: CPT

## 2020-01-01 PROCEDURE — 99233 SBSQ HOSP IP/OBS HIGH 50: CPT

## 2020-01-01 PROCEDURE — 85730 THROMBOPLASTIN TIME PARTIAL: CPT

## 2020-01-01 PROCEDURE — 36600 WITHDRAWAL OF ARTERIAL BLOOD: CPT

## 2020-01-01 PROCEDURE — 83735 ASSAY OF MAGNESIUM: CPT

## 2020-01-01 PROCEDURE — 82009 KETONE BODYS QUAL: CPT

## 2020-01-01 PROCEDURE — 71045 X-RAY EXAM CHEST 1 VIEW: CPT

## 2020-01-01 PROCEDURE — 83615 LACTATE (LD) (LDH) ENZYME: CPT

## 2020-01-01 PROCEDURE — 96372 THER/PROPH/DIAG INJ SC/IM: CPT

## 2020-01-01 PROCEDURE — 96361 HYDRATE IV INFUSION ADD-ON: CPT

## 2020-01-01 PROCEDURE — 83036 HEMOGLOBIN GLYCOSYLATED A1C: CPT

## 2020-01-01 PROCEDURE — 87486 CHLMYD PNEUM DNA AMP PROBE: CPT

## 2020-01-01 PROCEDURE — 87040 BLOOD CULTURE FOR BACTERIA: CPT

## 2020-01-01 PROCEDURE — 82962 GLUCOSE BLOOD TEST: CPT

## 2020-01-01 PROCEDURE — 86140 C-REACTIVE PROTEIN: CPT

## 2020-01-01 PROCEDURE — 71045 X-RAY EXAM CHEST 1 VIEW: CPT | Mod: 26

## 2020-01-01 PROCEDURE — 36415 COLL VENOUS BLD VENIPUNCTURE: CPT

## 2020-01-01 PROCEDURE — 85025 COMPLETE CBC W/AUTO DIFF WBC: CPT

## 2020-01-01 PROCEDURE — 80048 BASIC METABOLIC PNL TOTAL CA: CPT

## 2020-01-01 PROCEDURE — C9113: CPT

## 2020-01-01 PROCEDURE — 85610 PROTHROMBIN TIME: CPT

## 2020-01-01 PROCEDURE — 83880 ASSAY OF NATRIURETIC PEPTIDE: CPT

## 2020-01-01 PROCEDURE — 94002 VENT MGMT INPAT INIT DAY: CPT

## 2020-01-01 PROCEDURE — 96374 THER/PROPH/DIAG INJ IV PUSH: CPT

## 2020-01-01 PROCEDURE — 84443 ASSAY THYROID STIM HORMONE: CPT

## 2020-01-01 PROCEDURE — 96375 TX/PRO/DX INJ NEW DRUG ADDON: CPT

## 2020-01-01 PROCEDURE — 71045 X-RAY EXAM CHEST 1 VIEW: CPT | Mod: 26,76

## 2020-01-01 PROCEDURE — 85027 COMPLETE CBC AUTOMATED: CPT

## 2020-01-01 PROCEDURE — 82728 ASSAY OF FERRITIN: CPT

## 2020-01-01 PROCEDURE — 94003 VENT MGMT INPAT SUBQ DAY: CPT

## 2020-01-01 PROCEDURE — 82550 ASSAY OF CK (CPK): CPT

## 2020-01-01 PROCEDURE — 87635 SARS-COV-2 COVID-19 AMP PRB: CPT

## 2020-01-01 PROCEDURE — 86803 HEPATITIS C AB TEST: CPT

## 2020-01-01 PROCEDURE — 84145 PROCALCITONIN (PCT): CPT

## 2020-01-01 PROCEDURE — 87086 URINE CULTURE/COLONY COUNT: CPT

## 2020-01-01 PROCEDURE — 83520 IMMUNOASSAY QUANT NOS NONAB: CPT

## 2020-01-01 PROCEDURE — 99291 CRITICAL CARE FIRST HOUR: CPT | Mod: 25

## 2020-01-01 PROCEDURE — 84484 ASSAY OF TROPONIN QUANT: CPT

## 2020-01-01 PROCEDURE — 36000 PLACE NEEDLE IN VEIN: CPT

## 2020-01-01 PROCEDURE — 85379 FIBRIN DEGRADATION QUANT: CPT

## 2020-01-01 PROCEDURE — 80053 COMPREHEN METABOLIC PANEL: CPT

## 2020-01-01 PROCEDURE — 85652 RBC SED RATE AUTOMATED: CPT

## 2020-01-01 PROCEDURE — 87798 DETECT AGENT NOS DNA AMP: CPT

## 2020-01-01 PROCEDURE — 82955 ASSAY OF G6PD ENZYME: CPT

## 2020-01-01 PROCEDURE — 87581 M.PNEUMON DNA AMP PROBE: CPT

## 2020-01-01 PROCEDURE — 99283 EMERGENCY DEPT VISIT LOW MDM: CPT

## 2020-01-01 PROCEDURE — 83605 ASSAY OF LACTIC ACID: CPT

## 2020-01-01 PROCEDURE — 87633 RESP VIRUS 12-25 TARGETS: CPT

## 2020-01-01 PROCEDURE — 86360 T CELL ABSOLUTE COUNT/RATIO: CPT

## 2020-01-01 PROCEDURE — 93005 ELECTROCARDIOGRAM TRACING: CPT

## 2020-01-01 PROCEDURE — 94760 N-INVAS EAR/PLS OXIMETRY 1: CPT

## 2020-01-01 PROCEDURE — 81001 URINALYSIS AUTO W/SCOPE: CPT

## 2020-01-01 RX ORDER — SODIUM CHLORIDE 9 MG/ML
500 INJECTION, SOLUTION INTRAVENOUS ONCE
Refills: 0 | Status: COMPLETED | OUTPATIENT
Start: 2020-01-01 | End: 2020-01-01

## 2020-01-01 RX ORDER — POTASSIUM CHLORIDE 20 MEQ
40 PACKET (EA) ORAL ONCE
Refills: 0 | Status: DISCONTINUED | OUTPATIENT
Start: 2020-01-01 | End: 2020-01-01

## 2020-01-01 RX ORDER — METFORMIN HYDROCHLORIDE 850 MG/1
500 TABLET ORAL ONCE
Refills: 0 | Status: COMPLETED | OUTPATIENT
Start: 2020-01-01 | End: 2020-01-01

## 2020-01-01 RX ORDER — SODIUM CHLORIDE 9 MG/ML
1000 INJECTION, SOLUTION INTRAVENOUS
Refills: 0 | Status: DISCONTINUED | OUTPATIENT
Start: 2020-01-01 | End: 2020-01-01

## 2020-01-01 RX ORDER — POTASSIUM CHLORIDE 20 MEQ
10 PACKET (EA) ORAL
Refills: 0 | Status: COMPLETED | OUTPATIENT
Start: 2020-01-01 | End: 2020-01-01

## 2020-01-01 RX ORDER — CEFTRIAXONE 500 MG/1
1000 INJECTION, POWDER, FOR SOLUTION INTRAMUSCULAR; INTRAVENOUS EVERY 24 HOURS
Refills: 0 | Status: DISCONTINUED | OUTPATIENT
Start: 2020-01-01 | End: 2020-01-01

## 2020-01-01 RX ORDER — PIPERACILLIN AND TAZOBACTAM 4; .5 G/20ML; G/20ML
3.38 INJECTION, POWDER, LYOPHILIZED, FOR SOLUTION INTRAVENOUS ONCE
Refills: 0 | Status: COMPLETED | OUTPATIENT
Start: 2020-01-01 | End: 2020-01-01

## 2020-01-01 RX ORDER — ETOMIDATE 2 MG/ML
10 INJECTION INTRAVENOUS ONCE
Refills: 0 | Status: COMPLETED | OUTPATIENT
Start: 2020-01-01 | End: 2020-01-01

## 2020-01-01 RX ORDER — DEXTROSE 50 % IN WATER 50 %
15 SYRINGE (ML) INTRAVENOUS ONCE
Refills: 0 | Status: DISCONTINUED | OUTPATIENT
Start: 2020-01-01 | End: 2020-01-01

## 2020-01-01 RX ORDER — METRONIDAZOLE 500 MG
0 TABLET ORAL
Qty: 0 | Refills: 0 | DISCHARGE
Start: 2020-01-01

## 2020-01-01 RX ORDER — PANTOPRAZOLE SODIUM 20 MG/1
40 TABLET, DELAYED RELEASE ORAL DAILY
Refills: 0 | Status: DISCONTINUED | OUTPATIENT
Start: 2020-01-01 | End: 2020-01-01

## 2020-01-01 RX ORDER — INSULIN LISPRO 100/ML
VIAL (ML) SUBCUTANEOUS
Refills: 0 | Status: DISCONTINUED | OUTPATIENT
Start: 2020-01-01 | End: 2020-01-01

## 2020-01-01 RX ORDER — POTASSIUM CHLORIDE 20 MEQ
40 PACKET (EA) ORAL EVERY 4 HOURS
Refills: 0 | Status: COMPLETED | OUTPATIENT
Start: 2020-01-01 | End: 2020-01-01

## 2020-01-01 RX ORDER — METRONIDAZOLE 500 MG
500 TABLET ORAL ONCE
Refills: 0 | Status: COMPLETED | OUTPATIENT
Start: 2020-01-01 | End: 2020-01-01

## 2020-01-01 RX ORDER — HEPARIN SODIUM 5000 [USP'U]/ML
0 INJECTION INTRAVENOUS; SUBCUTANEOUS
Qty: 0 | Refills: 0 | DISCHARGE
Start: 2020-01-01

## 2020-01-01 RX ORDER — PROPOFOL 10 MG/ML
10 INJECTION, EMULSION INTRAVENOUS
Qty: 1000 | Refills: 0 | Status: DISCONTINUED | OUTPATIENT
Start: 2020-01-01 | End: 2020-01-01

## 2020-01-01 RX ORDER — HALOPERIDOL DECANOATE 100 MG/ML
0 INJECTION INTRAMUSCULAR
Qty: 0 | Refills: 0 | DISCHARGE
Start: 2020-01-01

## 2020-01-01 RX ORDER — SODIUM CHLORIDE 9 MG/ML
2000 INJECTION INTRAMUSCULAR; INTRAVENOUS; SUBCUTANEOUS ONCE
Refills: 0 | Status: COMPLETED | OUTPATIENT
Start: 2020-01-01 | End: 2020-01-01

## 2020-01-01 RX ORDER — INSULIN HUMAN 100 [IU]/ML
9 INJECTION, SOLUTION SUBCUTANEOUS
Qty: 100 | Refills: 0 | Status: DISCONTINUED | OUTPATIENT
Start: 2020-01-01 | End: 2020-01-01

## 2020-01-01 RX ORDER — DEXTROSE 50 % IN WATER 50 %
25 SYRINGE (ML) INTRAVENOUS ONCE
Refills: 0 | Status: DISCONTINUED | OUTPATIENT
Start: 2020-01-01 | End: 2020-01-01

## 2020-01-01 RX ORDER — DEXTROSE 50 % IN WATER 50 %
12.5 SYRINGE (ML) INTRAVENOUS ONCE
Refills: 0 | Status: DISCONTINUED | OUTPATIENT
Start: 2020-01-01 | End: 2020-01-01

## 2020-01-01 RX ORDER — ACETAMINOPHEN 500 MG
650 TABLET ORAL EVERY 6 HOURS
Refills: 0 | Status: DISCONTINUED | OUTPATIENT
Start: 2020-01-01 | End: 2020-01-01

## 2020-01-01 RX ORDER — POTASSIUM CHLORIDE 20 MEQ
10 PACKET (EA) ORAL
Refills: 0 | Status: DISCONTINUED | OUTPATIENT
Start: 2020-01-01 | End: 2020-01-01

## 2020-01-01 RX ORDER — CEFTRIAXONE 500 MG/1
0 INJECTION, POWDER, FOR SOLUTION INTRAMUSCULAR; INTRAVENOUS
Qty: 0 | Refills: 0 | DISCHARGE
Start: 2020-01-01

## 2020-01-01 RX ORDER — INSULIN GLARGINE 100 [IU]/ML
5 INJECTION, SOLUTION SUBCUTANEOUS EVERY MORNING
Refills: 0 | Status: DISCONTINUED | OUTPATIENT
Start: 2020-01-01 | End: 2020-01-01

## 2020-01-01 RX ORDER — HALOPERIDOL DECANOATE 100 MG/ML
2 INJECTION INTRAMUSCULAR ONCE
Refills: 0 | Status: DISCONTINUED | OUTPATIENT
Start: 2020-01-01 | End: 2020-01-01

## 2020-01-01 RX ORDER — POTASSIUM CHLORIDE 20 MEQ
20 PACKET (EA) ORAL ONCE
Refills: 0 | Status: DISCONTINUED | OUTPATIENT
Start: 2020-01-01 | End: 2020-01-01

## 2020-01-01 RX ORDER — MAGNESIUM SULFATE 500 MG/ML
2 VIAL (ML) INJECTION ONCE
Refills: 0 | Status: COMPLETED | OUTPATIENT
Start: 2020-01-01 | End: 2020-01-01

## 2020-01-01 RX ORDER — GLUCAGON INJECTION, SOLUTION 0.5 MG/.1ML
1 INJECTION, SOLUTION SUBCUTANEOUS ONCE
Refills: 0 | Status: DISCONTINUED | OUTPATIENT
Start: 2020-01-01 | End: 2020-01-01

## 2020-01-01 RX ORDER — CHLORHEXIDINE GLUCONATE 213 G/1000ML
15 SOLUTION TOPICAL EVERY 12 HOURS
Refills: 0 | Status: DISCONTINUED | OUTPATIENT
Start: 2020-01-01 | End: 2020-01-01

## 2020-01-01 RX ORDER — INSULIN LISPRO 100/ML
4 VIAL (ML) SUBCUTANEOUS EVERY 6 HOURS
Refills: 0 | Status: DISCONTINUED | OUTPATIENT
Start: 2020-01-01 | End: 2020-01-01

## 2020-01-01 RX ORDER — AZITHROMYCIN 500 MG/1
500 TABLET, FILM COATED ORAL EVERY 24 HOURS
Refills: 0 | Status: DISCONTINUED | OUTPATIENT
Start: 2020-01-01 | End: 2020-01-01

## 2020-01-01 RX ORDER — METOPROLOL TARTRATE 50 MG
2.5 TABLET ORAL ONCE
Refills: 0 | Status: COMPLETED | OUTPATIENT
Start: 2020-01-01 | End: 2020-01-01

## 2020-01-01 RX ORDER — PIPERACILLIN AND TAZOBACTAM 4; .5 G/20ML; G/20ML
3.38 INJECTION, POWDER, LYOPHILIZED, FOR SOLUTION INTRAVENOUS EVERY 8 HOURS
Refills: 0 | Status: DISCONTINUED | OUTPATIENT
Start: 2020-01-01 | End: 2020-01-01

## 2020-01-01 RX ORDER — SODIUM CHLORIDE 9 MG/ML
1000 INJECTION INTRAMUSCULAR; INTRAVENOUS; SUBCUTANEOUS
Refills: 0 | Status: DISCONTINUED | OUTPATIENT
Start: 2020-01-01 | End: 2020-01-01

## 2020-01-01 RX ORDER — AZITHROMYCIN 500 MG/1
250 TABLET, FILM COATED ORAL ONCE
Refills: 0 | Status: DISCONTINUED | OUTPATIENT
Start: 2020-01-01 | End: 2020-01-01

## 2020-01-01 RX ORDER — SODIUM CHLORIDE 9 MG/ML
1000 INJECTION, SOLUTION INTRAVENOUS
Qty: 0 | Refills: 0 | DISCHARGE
Start: 2020-01-01

## 2020-01-01 RX ORDER — SODIUM CHLORIDE 9 MG/ML
1000 INJECTION, SOLUTION INTRAVENOUS ONCE
Refills: 0 | Status: COMPLETED | OUTPATIENT
Start: 2020-01-01 | End: 2020-01-01

## 2020-01-01 RX ORDER — INSULIN LISPRO 100/ML
VIAL (ML) SUBCUTANEOUS EVERY 6 HOURS
Refills: 0 | Status: DISCONTINUED | OUTPATIENT
Start: 2020-01-01 | End: 2020-01-01

## 2020-01-01 RX ORDER — NOREPINEPHRINE BITARTRATE/D5W 8 MG/250ML
0.05 PLASTIC BAG, INJECTION (ML) INTRAVENOUS
Qty: 8 | Refills: 0 | Status: DISCONTINUED | OUTPATIENT
Start: 2020-01-01 | End: 2020-01-01

## 2020-01-01 RX ORDER — CEFTRIAXONE 500 MG/1
1000 INJECTION, POWDER, FOR SOLUTION INTRAMUSCULAR; INTRAVENOUS ONCE
Refills: 0 | Status: COMPLETED | OUTPATIENT
Start: 2020-01-01 | End: 2020-01-01

## 2020-01-01 RX ORDER — ACETAMINOPHEN 500 MG
1000 TABLET ORAL ONCE
Refills: 0 | Status: COMPLETED | OUTPATIENT
Start: 2020-01-01 | End: 2020-01-01

## 2020-01-01 RX ORDER — INSULIN LISPRO 100/ML
10 VIAL (ML) SUBCUTANEOUS
Refills: 0 | Status: DISCONTINUED | OUTPATIENT
Start: 2020-01-01 | End: 2020-01-01

## 2020-01-01 RX ORDER — INSULIN HUMAN 100 [IU]/ML
7 INJECTION, SOLUTION SUBCUTANEOUS
Qty: 100 | Refills: 0 | Status: DISCONTINUED | OUTPATIENT
Start: 2020-01-01 | End: 2020-01-01

## 2020-01-01 RX ORDER — INSULIN GLARGINE 100 [IU]/ML
15 INJECTION, SOLUTION SUBCUTANEOUS EVERY MORNING
Refills: 0 | Status: DISCONTINUED | OUTPATIENT
Start: 2020-01-01 | End: 2020-01-01

## 2020-01-01 RX ORDER — ROCURONIUM BROMIDE 10 MG/ML
40 VIAL (ML) INTRAVENOUS ONCE
Refills: 0 | Status: DISCONTINUED | OUTPATIENT
Start: 2020-01-01 | End: 2020-01-01

## 2020-01-01 RX ORDER — INSULIN GLARGINE 100 [IU]/ML
30 INJECTION, SOLUTION SUBCUTANEOUS AT BEDTIME
Refills: 0 | Status: DISCONTINUED | OUTPATIENT
Start: 2020-01-01 | End: 2020-01-01

## 2020-01-01 RX ORDER — INSULIN GLARGINE 100 [IU]/ML
0 INJECTION, SOLUTION SUBCUTANEOUS
Qty: 0 | Refills: 0 | DISCHARGE
Start: 2020-01-01

## 2020-01-01 RX ORDER — PROPOFOL 10 MG/ML
50 INJECTION, EMULSION INTRAVENOUS
Qty: 1000 | Refills: 0 | Status: DISCONTINUED | OUTPATIENT
Start: 2020-01-01 | End: 2020-01-01

## 2020-01-01 RX ORDER — HALOPERIDOL DECANOATE 100 MG/ML
2 INJECTION INTRAMUSCULAR ONCE
Refills: 0 | Status: COMPLETED | OUTPATIENT
Start: 2020-01-01 | End: 2020-01-01

## 2020-01-01 RX ORDER — INSULIN GLARGINE 100 [IU]/ML
5 INJECTION, SOLUTION SUBCUTANEOUS AT BEDTIME
Refills: 0 | Status: DISCONTINUED | OUTPATIENT
Start: 2020-01-01 | End: 2020-01-01

## 2020-01-01 RX ORDER — INSULIN GLARGINE 100 [IU]/ML
20 INJECTION, SOLUTION SUBCUTANEOUS EVERY MORNING
Refills: 0 | Status: DISCONTINUED | OUTPATIENT
Start: 2020-01-01 | End: 2020-01-01

## 2020-01-01 RX ORDER — HALOPERIDOL DECANOATE 100 MG/ML
2 INJECTION INTRAMUSCULAR EVERY 6 HOURS
Refills: 0 | Status: DISCONTINUED | OUTPATIENT
Start: 2020-01-01 | End: 2020-01-01

## 2020-01-01 RX ORDER — SODIUM,POTASSIUM PHOSPHATES 278-250MG
1 POWDER IN PACKET (EA) ORAL
Refills: 0 | Status: COMPLETED | OUTPATIENT
Start: 2020-01-01 | End: 2020-01-01

## 2020-01-01 RX ORDER — SUCCINYLCHOLINE CHLORIDE 100 MG/5ML
140 SYRINGE (ML) INTRAVENOUS ONCE
Refills: 0 | Status: COMPLETED | OUTPATIENT
Start: 2020-01-01 | End: 2020-01-01

## 2020-01-01 RX ORDER — PHENYLEPHRINE HYDROCHLORIDE 10 MG/ML
0.5 INJECTION INTRAVENOUS
Qty: 160 | Refills: 0 | Status: DISCONTINUED | OUTPATIENT
Start: 2020-01-01 | End: 2020-01-01

## 2020-01-01 RX ORDER — IPRATROPIUM BROMIDE 0.2 MG/ML
1 SOLUTION, NON-ORAL INHALATION EVERY 6 HOURS
Refills: 0 | Status: DISCONTINUED | OUTPATIENT
Start: 2020-01-01 | End: 2020-01-01

## 2020-01-01 RX ORDER — INSULIN LISPRO 100/ML
8 VIAL (ML) SUBCUTANEOUS
Refills: 0 | Status: DISCONTINUED | OUTPATIENT
Start: 2020-01-01 | End: 2020-01-01

## 2020-01-01 RX ORDER — INSULIN HUMAN 100 [IU]/ML
8 INJECTION, SOLUTION SUBCUTANEOUS ONCE
Refills: 0 | Status: COMPLETED | OUTPATIENT
Start: 2020-01-01 | End: 2020-01-01

## 2020-01-01 RX ORDER — POTASSIUM PHOSPHATE, MONOBASIC POTASSIUM PHOSPHATE, DIBASIC 236; 224 MG/ML; MG/ML
15 INJECTION, SOLUTION INTRAVENOUS EVERY 6 HOURS
Refills: 0 | Status: COMPLETED | OUTPATIENT
Start: 2020-01-01 | End: 2020-01-01

## 2020-01-01 RX ORDER — MIDODRINE HYDROCHLORIDE 2.5 MG/1
5 TABLET ORAL EVERY 8 HOURS
Refills: 0 | Status: DISCONTINUED | OUTPATIENT
Start: 2020-01-01 | End: 2020-01-01

## 2020-01-01 RX ORDER — POTASSIUM CHLORIDE 20 MEQ
40 PACKET (EA) ORAL ONCE
Refills: 0 | Status: COMPLETED | OUTPATIENT
Start: 2020-01-01 | End: 2020-01-01

## 2020-01-01 RX ORDER — ROCURONIUM BROMIDE 10 MG/ML
43 VIAL (ML) INTRAVENOUS ONCE
Refills: 0 | Status: COMPLETED | OUTPATIENT
Start: 2020-01-01 | End: 2020-01-01

## 2020-01-01 RX ORDER — POTASSIUM CHLORIDE 20 MEQ
40 PACKET (EA) ORAL EVERY 4 HOURS
Refills: 0 | Status: DISCONTINUED | OUTPATIENT
Start: 2020-01-01 | End: 2020-01-01

## 2020-01-01 RX ORDER — SODIUM CHLORIDE 9 MG/ML
500 INJECTION, SOLUTION INTRAVENOUS ONCE
Refills: 0 | Status: DISCONTINUED | OUTPATIENT
Start: 2020-01-01 | End: 2020-01-01

## 2020-01-01 RX ORDER — SODIUM CHLORIDE 9 MG/ML
1000 INJECTION INTRAMUSCULAR; INTRAVENOUS; SUBCUTANEOUS ONCE
Refills: 0 | Status: COMPLETED | OUTPATIENT
Start: 2020-01-01 | End: 2020-01-01

## 2020-01-01 RX ORDER — INSULIN GLARGINE 100 [IU]/ML
25 INJECTION, SOLUTION SUBCUTANEOUS AT BEDTIME
Refills: 0 | Status: DISCONTINUED | OUTPATIENT
Start: 2020-01-01 | End: 2020-01-01

## 2020-01-01 RX ORDER — SODIUM CHLORIDE 9 MG/ML
500 INJECTION INTRAMUSCULAR; INTRAVENOUS; SUBCUTANEOUS ONCE
Refills: 0 | Status: COMPLETED | OUTPATIENT
Start: 2020-01-01 | End: 2020-01-01

## 2020-01-01 RX ORDER — INSULIN GLARGINE 100 [IU]/ML
35 INJECTION, SOLUTION SUBCUTANEOUS AT BEDTIME
Refills: 0 | Status: DISCONTINUED | OUTPATIENT
Start: 2020-01-01 | End: 2020-01-01

## 2020-01-01 RX ORDER — FENTANYL CITRATE 50 UG/ML
0.5 INJECTION INTRAVENOUS
Qty: 5000 | Refills: 0 | Status: DISCONTINUED | OUTPATIENT
Start: 2020-01-01 | End: 2020-01-01

## 2020-01-01 RX ORDER — METFORMIN HYDROCHLORIDE 850 MG/1
1 TABLET ORAL
Qty: 60 | Refills: 0
Start: 2020-01-01 | End: 2020-04-30

## 2020-01-01 RX ORDER — ANAKINRA 100MG/0.67
100 SYRINGE (ML) SUBCUTANEOUS EVERY 6 HOURS
Refills: 0 | Status: DISCONTINUED | OUTPATIENT
Start: 2020-01-01 | End: 2020-01-01

## 2020-01-01 RX ORDER — CEFTRIAXONE 500 MG/1
INJECTION, POWDER, FOR SOLUTION INTRAMUSCULAR; INTRAVENOUS
Refills: 0 | Status: DISCONTINUED | OUTPATIENT
Start: 2020-01-01 | End: 2020-01-01

## 2020-01-01 RX ORDER — METRONIDAZOLE 500 MG
500 TABLET ORAL EVERY 8 HOURS
Refills: 0 | Status: DISCONTINUED | OUTPATIENT
Start: 2020-01-01 | End: 2020-01-01

## 2020-01-01 RX ORDER — AZITHROMYCIN 500 MG/1
500 TABLET, FILM COATED ORAL ONCE
Refills: 0 | Status: COMPLETED | OUTPATIENT
Start: 2020-01-01 | End: 2020-01-01

## 2020-01-01 RX ORDER — POTASSIUM PHOSPHATE, MONOBASIC POTASSIUM PHOSPHATE, DIBASIC 236; 224 MG/ML; MG/ML
30 INJECTION, SOLUTION INTRAVENOUS ONCE
Refills: 0 | Status: COMPLETED | OUTPATIENT
Start: 2020-01-01 | End: 2020-01-01

## 2020-01-01 RX ORDER — ALBUTEROL 90 UG/1
2 AEROSOL, METERED ORAL EVERY 6 HOURS
Refills: 0 | Status: DISCONTINUED | OUTPATIENT
Start: 2020-01-01 | End: 2020-01-01

## 2020-01-01 RX ORDER — CHLORHEXIDINE GLUCONATE 213 G/1000ML
1 SOLUTION TOPICAL
Refills: 0 | Status: DISCONTINUED | OUTPATIENT
Start: 2020-01-01 | End: 2020-01-01

## 2020-01-01 RX ORDER — INSULIN LISPRO 100/ML
20 VIAL (ML) SUBCUTANEOUS ONCE
Refills: 0 | Status: COMPLETED | OUTPATIENT
Start: 2020-01-01 | End: 2020-01-01

## 2020-01-01 RX ORDER — PHENYLEPHRINE HYDROCHLORIDE 10 MG/ML
0.1 INJECTION INTRAVENOUS
Qty: 40 | Refills: 0 | Status: DISCONTINUED | OUTPATIENT
Start: 2020-01-01 | End: 2020-01-01

## 2020-01-01 RX ORDER — INSULIN HUMAN 100 [IU]/ML
12 INJECTION, SOLUTION SUBCUTANEOUS ONCE
Refills: 0 | Status: COMPLETED | OUTPATIENT
Start: 2020-01-01 | End: 2020-01-01

## 2020-01-01 RX ORDER — INSULIN HUMAN 100 [IU]/ML
8 INJECTION, SOLUTION SUBCUTANEOUS ONCE
Refills: 0 | Status: DISCONTINUED | OUTPATIENT
Start: 2020-01-01 | End: 2020-01-01

## 2020-01-01 RX ORDER — HEPARIN SODIUM 5000 [USP'U]/ML
5000 INJECTION INTRAVENOUS; SUBCUTANEOUS EVERY 8 HOURS
Refills: 0 | Status: DISCONTINUED | OUTPATIENT
Start: 2020-01-01 | End: 2020-01-01

## 2020-01-01 RX ORDER — ENOXAPARIN SODIUM 100 MG/ML
40 INJECTION SUBCUTANEOUS DAILY
Refills: 0 | Status: DISCONTINUED | OUTPATIENT
Start: 2020-01-01 | End: 2020-01-01

## 2020-01-01 RX ORDER — INSULIN GLARGINE 100 [IU]/ML
20 INJECTION, SOLUTION SUBCUTANEOUS ONCE
Refills: 0 | Status: COMPLETED | OUTPATIENT
Start: 2020-01-01 | End: 2020-01-01

## 2020-01-01 RX ORDER — THIAMINE MONONITRATE (VIT B1) 100 MG
500 TABLET ORAL DAILY
Refills: 0 | Status: DISCONTINUED | OUTPATIENT
Start: 2020-01-01 | End: 2020-01-01

## 2020-01-01 RX ORDER — DEXTROSE MONOHYDRATE, SODIUM CHLORIDE, AND POTASSIUM CHLORIDE 50; .745; 4.5 G/1000ML; G/1000ML; G/1000ML
1000 INJECTION, SOLUTION INTRAVENOUS
Refills: 0 | Status: DISCONTINUED | OUTPATIENT
Start: 2020-01-01 | End: 2020-01-01

## 2020-01-01 RX ORDER — METRONIDAZOLE 500 MG
TABLET ORAL
Refills: 0 | Status: DISCONTINUED | OUTPATIENT
Start: 2020-01-01 | End: 2020-01-01

## 2020-01-01 RX ADMIN — HEPARIN SODIUM 5000 UNIT(S): 5000 INJECTION INTRAVENOUS; SUBCUTANEOUS at 13:58

## 2020-01-01 RX ADMIN — CHLORHEXIDINE GLUCONATE 15 MILLILITER(S): 213 SOLUTION TOPICAL at 17:52

## 2020-01-01 RX ADMIN — HEPARIN SODIUM 5000 UNIT(S): 5000 INJECTION INTRAVENOUS; SUBCUTANEOUS at 06:35

## 2020-01-01 RX ADMIN — Medication 2: at 05:12

## 2020-01-01 RX ADMIN — HEPARIN SODIUM 5000 UNIT(S): 5000 INJECTION INTRAVENOUS; SUBCUTANEOUS at 23:02

## 2020-01-01 RX ADMIN — PIPERACILLIN AND TAZOBACTAM 25 GRAM(S): 4; .5 INJECTION, POWDER, LYOPHILIZED, FOR SOLUTION INTRAVENOUS at 17:11

## 2020-01-01 RX ADMIN — MIDODRINE HYDROCHLORIDE 5 MILLIGRAM(S): 2.5 TABLET ORAL at 05:20

## 2020-01-01 RX ADMIN — AZITHROMYCIN 500 MILLIGRAM(S): 500 TABLET, FILM COATED ORAL at 00:02

## 2020-01-01 RX ADMIN — INSULIN GLARGINE 25 UNIT(S): 100 INJECTION, SOLUTION SUBCUTANEOUS at 00:24

## 2020-01-01 RX ADMIN — INSULIN GLARGINE 5 UNIT(S): 100 INJECTION, SOLUTION SUBCUTANEOUS at 23:15

## 2020-01-01 RX ADMIN — Medication 105 MILLIGRAM(S): at 11:14

## 2020-01-01 RX ADMIN — MIDODRINE HYDROCHLORIDE 5 MILLIGRAM(S): 2.5 TABLET ORAL at 23:03

## 2020-01-01 RX ADMIN — INSULIN GLARGINE 35 UNIT(S): 100 INJECTION, SOLUTION SUBCUTANEOUS at 00:41

## 2020-01-01 RX ADMIN — Medication 100 MILLIGRAM(S): at 23:00

## 2020-01-01 RX ADMIN — PANTOPRAZOLE SODIUM 40 MILLIGRAM(S): 20 TABLET, DELAYED RELEASE ORAL at 13:09

## 2020-01-01 RX ADMIN — Medication 10: at 23:44

## 2020-01-01 RX ADMIN — CHLORHEXIDINE GLUCONATE 15 MILLILITER(S): 213 SOLUTION TOPICAL at 05:17

## 2020-01-01 RX ADMIN — Medication 8 UNIT(S): at 12:21

## 2020-01-01 RX ADMIN — PIPERACILLIN AND TAZOBACTAM 25 GRAM(S): 4; .5 INJECTION, POWDER, LYOPHILIZED, FOR SOLUTION INTRAVENOUS at 00:02

## 2020-01-01 RX ADMIN — HEPARIN SODIUM 5000 UNIT(S): 5000 INJECTION INTRAVENOUS; SUBCUTANEOUS at 05:18

## 2020-01-01 RX ADMIN — PANTOPRAZOLE SODIUM 40 MILLIGRAM(S): 20 TABLET, DELAYED RELEASE ORAL at 17:11

## 2020-01-01 RX ADMIN — POTASSIUM PHOSPHATE, MONOBASIC POTASSIUM PHOSPHATE, DIBASIC 62.5 MILLIMOLE(S): 236; 224 INJECTION, SOLUTION INTRAVENOUS at 06:35

## 2020-01-01 RX ADMIN — HEPARIN SODIUM 5000 UNIT(S): 5000 INJECTION INTRAVENOUS; SUBCUTANEOUS at 05:51

## 2020-01-01 RX ADMIN — SODIUM CHLORIDE 125 MILLILITER(S): 9 INJECTION, SOLUTION INTRAVENOUS at 03:48

## 2020-01-01 RX ADMIN — Medication 100 MILLIEQUIVALENT(S): at 04:49

## 2020-01-01 RX ADMIN — SODIUM CHLORIDE 100 MILLILITER(S): 9 INJECTION, SOLUTION INTRAVENOUS at 11:40

## 2020-01-01 RX ADMIN — HEPARIN SODIUM 5000 UNIT(S): 5000 INJECTION INTRAVENOUS; SUBCUTANEOUS at 05:01

## 2020-01-01 RX ADMIN — SODIUM CHLORIDE 1000 MILLILITER(S): 9 INJECTION INTRAMUSCULAR; INTRAVENOUS; SUBCUTANEOUS at 18:07

## 2020-01-01 RX ADMIN — CHLORHEXIDINE GLUCONATE 15 MILLILITER(S): 213 SOLUTION TOPICAL at 05:50

## 2020-01-01 RX ADMIN — INSULIN GLARGINE 20 UNIT(S): 100 INJECTION, SOLUTION SUBCUTANEOUS at 10:00

## 2020-01-01 RX ADMIN — Medication 100 MILLIEQUIVALENT(S): at 04:28

## 2020-01-01 RX ADMIN — Medication 100 MILLIGRAM(S): at 09:05

## 2020-01-01 RX ADMIN — PIPERACILLIN AND TAZOBACTAM 25 GRAM(S): 4; .5 INJECTION, POWDER, LYOPHILIZED, FOR SOLUTION INTRAVENOUS at 06:17

## 2020-01-01 RX ADMIN — Medication 100 MILLIGRAM(S): at 21:05

## 2020-01-01 RX ADMIN — HALOPERIDOL DECANOATE 2 MILLIGRAM(S): 100 INJECTION INTRAMUSCULAR at 08:56

## 2020-01-01 RX ADMIN — HALOPERIDOL DECANOATE 2 MILLIGRAM(S): 100 INJECTION INTRAMUSCULAR at 03:25

## 2020-01-01 RX ADMIN — Medication 4: at 18:49

## 2020-01-01 RX ADMIN — Medication 100 MILLIEQUIVALENT(S): at 02:34

## 2020-01-01 RX ADMIN — SODIUM CHLORIDE 1000 MILLILITER(S): 9 INJECTION, SOLUTION INTRAVENOUS at 00:24

## 2020-01-01 RX ADMIN — INSULIN GLARGINE 30 UNIT(S): 100 INJECTION, SOLUTION SUBCUTANEOUS at 23:58

## 2020-01-01 RX ADMIN — CEFTRIAXONE 100 MILLIGRAM(S): 500 INJECTION, POWDER, FOR SOLUTION INTRAMUSCULAR; INTRAVENOUS at 00:02

## 2020-01-01 RX ADMIN — Medication 100 MILLIEQUIVALENT(S): at 12:20

## 2020-01-01 RX ADMIN — Medication 100 MILLIGRAM(S): at 14:15

## 2020-01-01 RX ADMIN — CEFTRIAXONE 1000 MILLIGRAM(S): 500 INJECTION, POWDER, FOR SOLUTION INTRAMUSCULAR; INTRAVENOUS at 01:45

## 2020-01-01 RX ADMIN — ETOMIDATE 10 MILLIGRAM(S): 2 INJECTION INTRAVENOUS at 13:48

## 2020-01-01 RX ADMIN — POTASSIUM PHOSPHATE, MONOBASIC POTASSIUM PHOSPHATE, DIBASIC 83.33 MILLIMOLE(S): 236; 224 INJECTION, SOLUTION INTRAVENOUS at 11:55

## 2020-01-01 RX ADMIN — PIPERACILLIN AND TAZOBACTAM 25 GRAM(S): 4; .5 INJECTION, POWDER, LYOPHILIZED, FOR SOLUTION INTRAVENOUS at 05:55

## 2020-01-01 RX ADMIN — Medication 10: at 06:57

## 2020-01-01 RX ADMIN — PIPERACILLIN AND TAZOBACTAM 25 GRAM(S): 4; .5 INJECTION, POWDER, LYOPHILIZED, FOR SOLUTION INTRAVENOUS at 23:03

## 2020-01-01 RX ADMIN — Medication 40 MILLIGRAM(S): at 06:18

## 2020-01-01 RX ADMIN — INSULIN HUMAN 7 UNIT(S)/HR: 100 INJECTION, SOLUTION SUBCUTANEOUS at 02:32

## 2020-01-01 RX ADMIN — Medication 2.5 MILLIGRAM(S): at 12:30

## 2020-01-01 RX ADMIN — INSULIN GLARGINE 20 UNIT(S): 100 INJECTION, SOLUTION SUBCUTANEOUS at 08:45

## 2020-01-01 RX ADMIN — Medication 6: at 18:00

## 2020-01-01 RX ADMIN — Medication 2: at 23:32

## 2020-01-01 RX ADMIN — HEPARIN SODIUM 5000 UNIT(S): 5000 INJECTION INTRAVENOUS; SUBCUTANEOUS at 05:12

## 2020-01-01 RX ADMIN — Medication 100 MILLIEQUIVALENT(S): at 11:57

## 2020-01-01 RX ADMIN — DEXTROSE MONOHYDRATE, SODIUM CHLORIDE, AND POTASSIUM CHLORIDE 75 MILLILITER(S): 50; .745; 4.5 INJECTION, SOLUTION INTRAVENOUS at 06:36

## 2020-01-01 RX ADMIN — Medication 4: at 11:33

## 2020-01-01 RX ADMIN — Medication 100 MILLIEQUIVALENT(S): at 17:41

## 2020-01-01 RX ADMIN — SODIUM CHLORIDE 2000 MILLILITER(S): 9 INJECTION INTRAMUSCULAR; INTRAVENOUS; SUBCUTANEOUS at 23:20

## 2020-01-01 RX ADMIN — HEPARIN SODIUM 5000 UNIT(S): 5000 INJECTION INTRAVENOUS; SUBCUTANEOUS at 16:35

## 2020-01-01 RX ADMIN — INSULIN GLARGINE 20 UNIT(S): 100 INJECTION, SOLUTION SUBCUTANEOUS at 10:18

## 2020-01-01 RX ADMIN — Medication 100 MILLIEQUIVALENT(S): at 09:21

## 2020-01-01 RX ADMIN — Medication 8 UNIT(S): at 18:43

## 2020-01-01 RX ADMIN — HALOPERIDOL DECANOATE 2 MILLIGRAM(S): 100 INJECTION INTRAMUSCULAR at 21:37

## 2020-01-01 RX ADMIN — Medication 100 MILLIEQUIVALENT(S): at 20:00

## 2020-01-01 RX ADMIN — POTASSIUM PHOSPHATE, MONOBASIC POTASSIUM PHOSPHATE, DIBASIC 62.5 MILLIMOLE(S): 236; 224 INJECTION, SOLUTION INTRAVENOUS at 23:08

## 2020-01-01 RX ADMIN — HEPARIN SODIUM 5000 UNIT(S): 5000 INJECTION INTRAVENOUS; SUBCUTANEOUS at 21:05

## 2020-01-01 RX ADMIN — CHLORHEXIDINE GLUCONATE 15 MILLILITER(S): 213 SOLUTION TOPICAL at 05:12

## 2020-01-01 RX ADMIN — CHLORHEXIDINE GLUCONATE 15 MILLILITER(S): 213 SOLUTION TOPICAL at 18:17

## 2020-01-01 RX ADMIN — CEFTRIAXONE 100 MILLIGRAM(S): 500 INJECTION, POWDER, FOR SOLUTION INTRAMUSCULAR; INTRAVENOUS at 08:45

## 2020-01-01 RX ADMIN — PROPOFOL 4.38 MICROGRAM(S)/KG/MIN: 10 INJECTION, EMULSION INTRAVENOUS at 06:31

## 2020-01-01 RX ADMIN — Medication 2: at 13:15

## 2020-01-01 RX ADMIN — CEFTRIAXONE 100 MILLIGRAM(S): 500 INJECTION, POWDER, FOR SOLUTION INTRAMUSCULAR; INTRAVENOUS at 09:54

## 2020-01-01 RX ADMIN — HEPARIN SODIUM 5000 UNIT(S): 5000 INJECTION INTRAVENOUS; SUBCUTANEOUS at 14:08

## 2020-01-01 RX ADMIN — SODIUM CHLORIDE 100 MILLILITER(S): 9 INJECTION, SOLUTION INTRAVENOUS at 18:48

## 2020-01-01 RX ADMIN — Medication 4 UNIT(S): at 18:53

## 2020-01-01 RX ADMIN — POTASSIUM PHOSPHATE, MONOBASIC POTASSIUM PHOSPHATE, DIBASIC 83.33 MILLIMOLE(S): 236; 224 INJECTION, SOLUTION INTRAVENOUS at 13:16

## 2020-01-01 RX ADMIN — HEPARIN SODIUM 5000 UNIT(S): 5000 INJECTION INTRAVENOUS; SUBCUTANEOUS at 06:16

## 2020-01-01 RX ADMIN — PANTOPRAZOLE SODIUM 40 MILLIGRAM(S): 20 TABLET, DELAYED RELEASE ORAL at 13:56

## 2020-01-01 RX ADMIN — Medication 100 MILLIGRAM(S): at 06:21

## 2020-01-01 RX ADMIN — PROPOFOL 4.38 MICROGRAM(S)/KG/MIN: 10 INJECTION, EMULSION INTRAVENOUS at 01:00

## 2020-01-01 RX ADMIN — Medication 100 MILLIGRAM(S): at 14:33

## 2020-01-01 RX ADMIN — SODIUM CHLORIDE 100 MILLILITER(S): 9 INJECTION, SOLUTION INTRAVENOUS at 11:56

## 2020-01-01 RX ADMIN — Medication 105 MILLIGRAM(S): at 13:56

## 2020-01-01 RX ADMIN — METFORMIN HYDROCHLORIDE 500 MILLIGRAM(S): 850 TABLET ORAL at 18:07

## 2020-01-01 RX ADMIN — Medication 50 GRAM(S): at 03:48

## 2020-01-01 RX ADMIN — HEPARIN SODIUM 5000 UNIT(S): 5000 INJECTION INTRAVENOUS; SUBCUTANEOUS at 23:00

## 2020-01-01 RX ADMIN — INSULIN HUMAN 8 UNIT(S): 100 INJECTION, SOLUTION SUBCUTANEOUS at 12:10

## 2020-01-01 RX ADMIN — Medication 20 UNIT(S): at 17:10

## 2020-01-01 RX ADMIN — INSULIN HUMAN 8 UNIT(S): 100 INJECTION, SOLUTION SUBCUTANEOUS at 01:56

## 2020-01-01 RX ADMIN — SODIUM CHLORIDE 1000 MILLILITER(S): 9 INJECTION, SOLUTION INTRAVENOUS at 12:51

## 2020-01-01 RX ADMIN — INSULIN HUMAN 7 UNIT(S)/HR: 100 INJECTION, SOLUTION SUBCUTANEOUS at 01:19

## 2020-01-01 RX ADMIN — POTASSIUM PHOSPHATE, MONOBASIC POTASSIUM PHOSPHATE, DIBASIC 62.5 MILLIMOLE(S): 236; 224 INJECTION, SOLUTION INTRAVENOUS at 18:09

## 2020-01-01 RX ADMIN — HEPARIN SODIUM 5000 UNIT(S): 5000 INJECTION INTRAVENOUS; SUBCUTANEOUS at 23:15

## 2020-01-01 RX ADMIN — Medication 43 MILLIGRAM(S): at 18:11

## 2020-01-01 RX ADMIN — PIPERACILLIN AND TAZOBACTAM 200 GRAM(S): 4; .5 INJECTION, POWDER, LYOPHILIZED, FOR SOLUTION INTRAVENOUS at 21:55

## 2020-01-01 RX ADMIN — Medication 6: at 18:30

## 2020-01-01 RX ADMIN — PIPERACILLIN AND TAZOBACTAM 25 GRAM(S): 4; .5 INJECTION, POWDER, LYOPHILIZED, FOR SOLUTION INTRAVENOUS at 05:19

## 2020-01-01 RX ADMIN — CHLORHEXIDINE GLUCONATE 15 MILLILITER(S): 213 SOLUTION TOPICAL at 16:39

## 2020-01-01 RX ADMIN — Medication 650 MILLIGRAM(S): at 14:00

## 2020-01-01 RX ADMIN — HEPARIN SODIUM 5000 UNIT(S): 5000 INJECTION INTRAVENOUS; SUBCUTANEOUS at 21:37

## 2020-01-01 RX ADMIN — FENTANYL CITRATE 3.65 MICROGRAM(S)/KG/HR: 50 INJECTION INTRAVENOUS at 18:45

## 2020-01-01 RX ADMIN — CHLORHEXIDINE GLUCONATE 1 APPLICATION(S): 213 SOLUTION TOPICAL at 06:57

## 2020-01-01 RX ADMIN — CEFTRIAXONE 100 MILLIGRAM(S): 500 INJECTION, POWDER, FOR SOLUTION INTRAMUSCULAR; INTRAVENOUS at 08:13

## 2020-01-01 RX ADMIN — Medication 100 MILLIEQUIVALENT(S): at 11:39

## 2020-01-01 RX ADMIN — HEPARIN SODIUM 5000 UNIT(S): 5000 INJECTION INTRAVENOUS; SUBCUTANEOUS at 23:08

## 2020-01-01 RX ADMIN — SODIUM CHLORIDE 2000 MILLILITER(S): 9 INJECTION, SOLUTION INTRAVENOUS at 21:30

## 2020-01-01 RX ADMIN — INSULIN HUMAN 12 UNIT(S): 100 INJECTION, SOLUTION SUBCUTANEOUS at 22:36

## 2020-01-01 RX ADMIN — Medication 1 TABLET(S): at 23:14

## 2020-01-01 RX ADMIN — HEPARIN SODIUM 5000 UNIT(S): 5000 INJECTION INTRAVENOUS; SUBCUTANEOUS at 06:21

## 2020-01-01 RX ADMIN — Medication 650 MILLIGRAM(S): at 21:00

## 2020-01-01 RX ADMIN — Medication 100 MILLIGRAM(S): at 14:06

## 2020-01-01 RX ADMIN — PIPERACILLIN AND TAZOBACTAM 25 GRAM(S): 4; .5 INJECTION, POWDER, LYOPHILIZED, FOR SOLUTION INTRAVENOUS at 13:09

## 2020-01-01 RX ADMIN — HEPARIN SODIUM 5000 UNIT(S): 5000 INJECTION INTRAVENOUS; SUBCUTANEOUS at 14:25

## 2020-01-01 RX ADMIN — HEPARIN SODIUM 5000 UNIT(S): 5000 INJECTION INTRAVENOUS; SUBCUTANEOUS at 21:53

## 2020-01-01 RX ADMIN — Medication 40 MILLIEQUIVALENT(S): at 01:44

## 2020-01-01 RX ADMIN — SODIUM CHLORIDE 1000 MILLILITER(S): 9 INJECTION, SOLUTION INTRAVENOUS at 00:07

## 2020-01-01 RX ADMIN — HEPARIN SODIUM 5000 UNIT(S): 5000 INJECTION INTRAVENOUS; SUBCUTANEOUS at 18:08

## 2020-01-01 RX ADMIN — Medication 0.5 MILLIGRAM(S): at 14:47

## 2020-01-01 RX ADMIN — Medication 100 MILLIEQUIVALENT(S): at 14:26

## 2020-01-01 RX ADMIN — Medication 4: at 06:13

## 2020-01-01 RX ADMIN — Medication 100 MILLIEQUIVALENT(S): at 03:43

## 2020-01-01 RX ADMIN — HEPARIN SODIUM 5000 UNIT(S): 5000 INJECTION INTRAVENOUS; SUBCUTANEOUS at 22:47

## 2020-01-01 RX ADMIN — Medication 80 MILLIGRAM(S): at 06:31

## 2020-01-01 RX ADMIN — CHLORHEXIDINE GLUCONATE 15 MILLILITER(S): 213 SOLUTION TOPICAL at 06:57

## 2020-01-01 RX ADMIN — HEPARIN SODIUM 5000 UNIT(S): 5000 INJECTION INTRAVENOUS; SUBCUTANEOUS at 00:08

## 2020-01-01 RX ADMIN — Medication 100 MILLIEQUIVALENT(S): at 05:50

## 2020-01-01 RX ADMIN — PROPOFOL 4.38 MICROGRAM(S)/KG/MIN: 10 INJECTION, EMULSION INTRAVENOUS at 18:48

## 2020-01-01 RX ADMIN — HEPARIN SODIUM 5000 UNIT(S): 5000 INJECTION INTRAVENOUS; SUBCUTANEOUS at 05:54

## 2020-01-01 RX ADMIN — SODIUM CHLORIDE 1000 MILLILITER(S): 9 INJECTION, SOLUTION INTRAVENOUS at 04:23

## 2020-01-01 RX ADMIN — CHLORHEXIDINE GLUCONATE 1 APPLICATION(S): 213 SOLUTION TOPICAL at 05:17

## 2020-01-01 RX ADMIN — Medication 8: at 12:47

## 2020-01-01 RX ADMIN — Medication 100 MILLIEQUIVALENT(S): at 02:19

## 2020-01-01 RX ADMIN — Medication 1 TABLET(S): at 18:55

## 2020-01-01 RX ADMIN — HEPARIN SODIUM 5000 UNIT(S): 5000 INJECTION INTRAVENOUS; SUBCUTANEOUS at 14:11

## 2020-01-01 RX ADMIN — SODIUM CHLORIDE 2000 MILLILITER(S): 9 INJECTION, SOLUTION INTRAVENOUS at 05:00

## 2020-01-01 RX ADMIN — CHLORHEXIDINE GLUCONATE 1 APPLICATION(S): 213 SOLUTION TOPICAL at 06:21

## 2020-01-01 RX ADMIN — Medication 400 MILLIGRAM(S): at 02:35

## 2020-01-01 RX ADMIN — Medication 100 MILLIEQUIVALENT(S): at 23:14

## 2020-01-01 RX ADMIN — HEPARIN SODIUM 5000 UNIT(S): 5000 INJECTION INTRAVENOUS; SUBCUTANEOUS at 13:08

## 2020-01-01 RX ADMIN — PROPOFOL 4.34 MICROGRAM(S)/KG/MIN: 10 INJECTION, EMULSION INTRAVENOUS at 12:49

## 2020-01-01 RX ADMIN — SODIUM CHLORIDE 2000 MILLILITER(S): 9 INJECTION INTRAMUSCULAR; INTRAVENOUS; SUBCUTANEOUS at 21:12

## 2020-01-01 RX ADMIN — CHLORHEXIDINE GLUCONATE 15 MILLILITER(S): 213 SOLUTION TOPICAL at 18:49

## 2020-01-01 RX ADMIN — Medication 100 MILLIEQUIVALENT(S): at 16:19

## 2020-01-01 RX ADMIN — Medication 8: at 00:25

## 2020-01-01 RX ADMIN — Medication 100 MILLIGRAM(S): at 05:12

## 2020-01-01 RX ADMIN — Medication 8: at 12:21

## 2020-01-01 RX ADMIN — POTASSIUM PHOSPHATE, MONOBASIC POTASSIUM PHOSPHATE, DIBASIC 62.5 MILLIMOLE(S): 236; 224 INJECTION, SOLUTION INTRAVENOUS at 12:24

## 2020-01-01 RX ADMIN — Medication 100 MILLIEQUIVALENT(S): at 07:28

## 2020-01-01 RX ADMIN — INSULIN GLARGINE 20 UNIT(S): 100 INJECTION, SOLUTION SUBCUTANEOUS at 09:38

## 2020-01-01 RX ADMIN — Medication 40 MILLIGRAM(S): at 23:02

## 2020-01-01 RX ADMIN — Medication 140 MILLIGRAM(S): at 13:51

## 2020-01-01 RX ADMIN — SODIUM CHLORIDE 75 MILLILITER(S): 9 INJECTION, SOLUTION INTRAVENOUS at 21:05

## 2020-01-01 RX ADMIN — INSULIN GLARGINE 20 UNIT(S): 100 INJECTION, SOLUTION SUBCUTANEOUS at 08:13

## 2020-01-01 RX ADMIN — Medication 4: at 17:50

## 2020-01-01 RX ADMIN — HEPARIN SODIUM 5000 UNIT(S): 5000 INJECTION INTRAVENOUS; SUBCUTANEOUS at 11:54

## 2020-01-01 RX ADMIN — Medication 6: at 12:50

## 2020-01-01 RX ADMIN — Medication 40 MILLIGRAM(S): at 05:19

## 2020-01-01 RX ADMIN — HALOPERIDOL DECANOATE 2 MILLIGRAM(S): 100 INJECTION INTRAMUSCULAR at 23:08

## 2020-01-01 RX ADMIN — Medication 40 MILLIGRAM(S): at 21:53

## 2020-01-01 RX ADMIN — Medication 400 MILLIGRAM(S): at 11:40

## 2020-01-01 RX ADMIN — HEPARIN SODIUM 5000 UNIT(S): 5000 INJECTION INTRAVENOUS; SUBCUTANEOUS at 14:06

## 2020-01-01 RX ADMIN — MIDODRINE HYDROCHLORIDE 5 MILLIGRAM(S): 2.5 TABLET ORAL at 13:09

## 2020-01-01 RX ADMIN — SODIUM CHLORIDE 2000 MILLILITER(S): 9 INJECTION INTRAMUSCULAR; INTRAVENOUS; SUBCUTANEOUS at 06:00

## 2020-01-01 RX ADMIN — Medication 6: at 06:23

## 2020-01-01 RX ADMIN — Medication 650 MILLIGRAM(S): at 03:15

## 2020-01-01 RX ADMIN — Medication 6: at 00:01

## 2020-04-01 PROBLEM — I10 ESSENTIAL (PRIMARY) HYPERTENSION: Chronic | Status: ACTIVE | Noted: 2017-09-27

## 2020-04-01 NOTE — ED PROVIDER NOTE - PATIENT PORTAL LINK FT
You can access the FollowMyHealth Patient Portal offered by [facility] by registering at the following website: [email or web address]. By joining [employer]’s FollowMyHealth portal, you will also be able to view your health information using other applications (apps) compatible with our system.

## 2020-04-01 NOTE — ED ADULT TRIAGE NOTE - CHIEF COMPLAINT QUOTE
c/o b/l weakness to legs on/off x 3 months and today I tripped. Denies falling. Stopped taking diabetic medication one year ago by choice

## 2020-04-01 NOTE — ED PROVIDER NOTE - CLINICAL SUMMARY MEDICAL DECISION MAKING FREE TEXT BOX
Likely dehydrated/fatigued from longstanding hyperglycemia, hydrated in ED, checking electrolytes, initiating metformin treatment for NIDDM. No e/o trauma or infectious process. Anticipate d/c home.

## 2020-04-01 NOTE — ED PROVIDER NOTE - OBJECTIVE STATEMENT
64 y/o M w/NIDDM (previously on meds >1yr ago but now noncompliant) p/w increased urination for past several weeks/months, generalized fatigue, was walking today and tripped on sidewalk falling forward. No head injury. No LOC. Able to stand unassisted and walk afterward. Denying pain of any kind. No CP/SOB/palpitations or prodromal sx. BIBEMS shortly thereafter for hyperglycemia and evaluation. BGM in field in 300s. Pt does not take his BGM at home. No fever/chills/recent illness. Admits to increased thirst.

## 2020-04-01 NOTE — ED PROVIDER NOTE - PHYSICAL EXAMINATION
VITAL SIGNS: I have reviewed nursing notes and confirm.  CONSTITUTIONAL: Well-developed; well-nourished; in no acute distress.  SKIN: Skin exam is warm and dry, no acute rash.  HEAD: Normocephalic; atraumatic.  EYES: PERRL, EOM intact; conjunctiva and sclera clear.  ENT: No nasal discharge; airway clear.  NECK: Supple; non tender.  CARD: S1, S2 normal; no murmurs, gallops, or rubs. Regular rate and rhythm.  RESP: Unlabored. No wheezes, rales or rhonchi.  ABD: soft; non-distended; non-tender  BACK: Kyphotic, no midline TTP or step-offs  EXT: Normal ROM. No cyanosis or edema. Non-ttp all ext, distal pulses intact  NEURO: Alert, oriented. Grossly unremarkable. gait intact, no facial asymmetry  PSYCH: Cooperative, appropriate.

## 2020-04-03 NOTE — ED ADULT TRIAGE NOTE - CHIEF COMPLAINT QUOTE
c/o hyperglycemia prescribed metformin but not taking, mental status change since death of mother, unable to care for self. oxygen on room air 90 per EMS. c/o hyperglycemia prescribed metformin but not taking, mental status change since death of mother, unable to care for self.

## 2020-04-03 NOTE — H&P ADULT - HISTORY OF PRESENT ILLNESS
Mr. Radford is a pleasant 65 year old  male w/ PMH NIDDM (used to take oral medications, now noncompliant) p/w malaise and increased thirst distress - Hx somewhat limited but able to express 2 weeks of complaints and presented to ED 2 days prior for increased urination for past several weeks/months, generalized fatigue, along w/ a mechanical fall - discharged w/ Metformin and Amlodipine. Currently patient denied any fever, chest pain, cough, dyspnea, or any other complaints. In the ED patient seen in moderate respiratory distress w/ accessory muscle use and labs consistent w/ moderate DKA (pH 7.21, AG 24, CO2 9, but alert and drowsy).

## 2020-04-03 NOTE — H&P ADULT - NSHPREVIEWOFSYSTEMS_GEN_ALL_CORE
CONSTITUTIONAL: + Fatigue  EYES: No eye pain, visual disturbances, or discharge  ENMT:  No difficulty hearing, tinnitus, vertigo; No sinus or throat pain  NECK: No pain or stiffness  BREASTS: No pain, masses, or nipple discharge  RESPIRATORY: No cough, wheezing, chills or hemoptysis; No shortness of breath  CARDIOVASCULAR: No chest pain, palpitations, dizziness, or leg swelling  GASTROINTESTINAL: No abdominal or epigastric pain. No nausea, vomiting, or hematemesis; No diarrhea or constipation. No melena or hematochezia.  GENITOURINARY: No dysuria, frequency, hematuria, or incontinence  NEUROLOGICAL: No headaches, memory loss, loss of strength, numbness, or tremors  SKIN: No itching, burning, rashes, or lesions   LYMPH NODES: No enlarged glands  ENDOCRINE: No heat or cold intolerance; No hair loss  MUSCULOSKELETAL: No joint pain or swelling; No muscle, back, or extremity pain  PSYCHIATRIC: No depression, anxiety, mood swings, or difficulty sleeping  HEME/LYMPH: No easy bruising, or bleeding gums

## 2020-04-03 NOTE — H&P ADULT - ASSESSMENT
Mr. Radford is a pleasant 65 year old  male w/ PMH NIDDM (used to take oral medications, now noncompliant) p/w malaise and increased thirst distress - Hx somewhat limited but able to express 2 weeks of complaints and presented to ED 2 days prior for increased urination for past several weeks/months, generalized fatigue, along w/ a mechanical fall - discharged w/ Metformin and Amlodipine. Currently patient denied any fever, chest pain, cough, dyspnea, or any other complaints. In the ED patient seen in moderate respiratory distress w/ accessory muscle use and labs consistent w/ moderate DKA (pH 7.21, AG 24, CO2 9, but alert and drowsy).    Assessment  - Moderate DKA  - SHAW    Neuro  - Drowsy, no focal neurologic deficits, gag intact    Cardio  HTN  - Elevated BP, recently given Amlodipine,     Resp  Moderate Respiratory Distress  - Likely 2/2 DKA as compensatory mechanism; will monitor closely for intubation PRN if patient fatigues  - No evidence of hypoxia as per ABG    GI  - No acute issues; NPO till AG closes and appetite improves    Renal  SHAW  - Unknown baseline; p/w 0.4 increase in Cr/2 days (1.5 from 1.1); likely prerenal, C/w IVFs    Endo  Moderate DKA  - 2/2 noncompliance; AG 24, Glc 453, and HCO3 9  - C/w IVF, Insulin drip, and monitor BMP Q4    Heme  - No acute issues; DVT PPx w/ HSQ    ID   - Sending COVID as per attending    Psych  - No acute issues

## 2020-04-03 NOTE — H&P ADULT - NSHPPHYSICALEXAM_GEN_ALL_CORE
ICU Vital Signs Last 24 Hrs  T(C): 36.2 (04 Apr 2020 00:35), Max: 37.4 (03 Apr 2020 20:12)  T(F): 97.2 (04 Apr 2020 00:35), Max: 99.4 (03 Apr 2020 20:12)  HR: 94 (04 Apr 2020 00:35) (94 - 118)  BP: 146/88 (04 Apr 2020 00:35) (146/88 - 150/114)  RR: 24 (04 Apr 2020 00:35) (24 - 24)  SpO2: 98% (04 Apr 2020 00:35) (98% - 99%)    PHYSICAL EXAM  General: A&Ox2; moderate respiratory distress  Head: NC/AT; PERRL; EOMI; anicteric sclera  Neck: Supple; no JVD  Respiratory: CTA B/L; no wheezes/crackles/rales auscultated w/ good air movement; + accessory muscle use  Cardiovascular: Sinus tachycardia  Gastrointestinal: Soft; NTND w/out rebound tenderness or guarding; bowel sounds normal  Extremities: WWP; no edema or cyanosis; radial/pedal pulses palpable; poor nail health  Neurological: CNII-XII grossly intact; no obvious focal deficits  Skin: intact

## 2020-04-03 NOTE — ED PROVIDER NOTE - OBJECTIVE STATEMENT
65 y.o. male BIBA pt c/o feeling weak, sob, ? last insulin dosage, pt lives alone, poor historian, no coughing, vomiting, abd pain

## 2020-04-03 NOTE — ED PROVIDER NOTE - CARE PLAN
Principal Discharge DX:	DKA (diabetic ketoacidoses)  Secondary Diagnosis:	Dehydration Principal Discharge DX:	DKA (diabetic ketoacidoses)  Secondary Diagnosis:	Dehydration  Secondary Diagnosis:	PNA (pneumonia)

## 2020-04-03 NOTE — H&P ADULT - ATTENDING COMMENTS
MICU ATTENDING. I have personally seen and examined the pt. I was physically present for the key elements service provided. I agree with above history, physiacl and plan which I edited where appropriate. I total spent 35 min critical acre time.   64 y/o with known DM, non complaint presented with fatigue, polyuria. In Er DKA and SHAW and respiratory distress.    A/P NIDDM   DKA   SHAW  respiratory failure hypoxic r/o COVID  MICU   NPO for possible intubation   cont oxygen [id number]  COVID PCR  hydration   insulin as per unit protocol  F/U FS  Panculture  Monitor urinary output, kidney function

## 2020-04-04 NOTE — ED ADULT NURSE NOTE - CHIEF COMPLAINT QUOTE
c/o hyperglycemia prescribed metformin but not taking, mental status change since death of mother, unable to care for self.

## 2020-04-04 NOTE — ED ADULT NURSE NOTE - OBJECTIVE STATEMENT
Pt presented to the ED with c/o of weakness. Pt is Hyperglycemic.  PT was seen in ED 2 days ago for c/o increased urination, and thirst.  PT is not currently the best historian.

## 2020-04-04 NOTE — PROGRESS NOTE ADULT - SUBJECTIVE AND OBJECTIVE BOX
INTERVAL /OVERNIGHT EVENTS:  Patient remains confused    PRESSORS: [ ] YES [ x] NO  WHICH:    ANTIBIOTICS:                  DATE STARTED:  ANTIBIOTICS:                  DATE STARTED:  ANTIBIOTICS:                  DATE STARTED:    Antimicrobial:    Cardiovascular:    Pulmonary:    Hematalogic:  heparin  Injectable 5000 Unit(s) SubCutaneous every 8 hours    Other:  dextrose 5%. 1000 milliLiter(s) IV Continuous <Continuous>  insulin regular Infusion 7 Unit(s)/Hr IV Continuous <Continuous>  potassium acid phosphate/sodium acid phosphate tablet (K-PHOS No. 2) 1 Tablet(s) Oral four times a day with meals  potassium chloride    Tablet ER 40 milliEquivalent(s) Oral every 4 hours  potassium phosphate IVPB 30 milliMole(s) IV Intermittent once    dextrose 5%. 1000 milliLiter(s) IV Continuous <Continuous>  heparin  Injectable 5000 Unit(s) SubCutaneous every 8 hours  insulin regular Infusion 7 Unit(s)/Hr IV Continuous <Continuous>  potassium acid phosphate/sodium acid phosphate tablet (K-PHOS No. 2) 1 Tablet(s) Oral four times a day with meals  potassium chloride    Tablet ER 40 milliEquivalent(s) Oral every 4 hours  potassium phosphate IVPB 30 milliMole(s) IV Intermittent once    Drug Dosing Weight  Height (cm): 188 (2020 05:21)  Weight (kg): 72.3 (2020 05:21)  BMI (kg/m2): 20.5 (2020 05:21)  BSA (m2): 1.97 (2020 05:21)    CENTRAL LINE: [ ] YES [x ] NO  LOCATION:   DATE INSERTED:  REMOVE: [ ] YES [ ] NO  EXPLAIN:    MADRID: [ ] YES [x ] NO    DATE INSERTED:  REMOVE:  [ ] YES [ ] NO  EXPLAIN:    A-LINE:  [ ] YES [ x] NO  LOCATION:   DATE INSERTED:  REMOVE:  [ ] YES [ ] NO  EXPLAIN:    PMH -reviewed admission note, no change since admission  Heart faliure: acute [ ] chronic [ ] acute or chronic [ ] diastolic [ ] systolic [ ] combied systolic and diastolic[ ]  SHAW: ATN[ ] renal medullary necrosis [ ] CKD I [ ]CKDII [ ]CKD III [ ]CKD IV [ ]CKD V [ ]Other pathological lesions [ ]  Abdominal Nutrition Status: malnutrition [ ] cachexia [ ] morbid obesity/BMI=40 [ ] Supplement ordered [___________]     ICU Vital Signs Last 24 Hrs  T(C): 37.4 (2020 05:21), Max: 37.4 (2020 20:12)  T(F): 99.4 (2020 05:21), Max: 99.4 (2020 20:12)  HR: 98 (2020 06:00) (94 - 118)  BP: 134/72 (2020 06:00) (134/72 - 150/114)  BP(mean): 85 (2020 06:00) (85 - 96)  ABP: --  ABP(mean): --  RR: 30 (2020 06:00) (23 - 30)  SpO2: 94% (2020 06:00) (94% - 99%)      ABG - ( 2020 05:54 )  pH, Arterial: 7.43  pH, Blood: x     /  pCO2: 20    /  pO2: 55    / HCO3: 13    / Base Excess: -8.7  /  SaO2: 91                    04-03 @ 07:01  -  -04 @ 07:00  --------------------------------------------------------  IN: 258 mL / OUT: 700 mL / NET: -442 mL            PHYSICAL EXAM:    GENERAL:   HEAD: atraumatic, normocephalic   EYES: PERRLA, white sclera.   ENMT: nasal mucosa- Oral cavity-   NECK: supple, JVD/ no JVD, thyroid-   LYMPH: no palpable lymph nodes     SKIN: warm, dry   CHEST/LUNG: ET tube: size        cm at lip. No Chest deformity , no chest tenderness. bilateral breath sounds,no  adventitious sounds  HEART: RRR, no m/r/g   ABDOMEN: soft, nontender, nondistended; bowel sounds.  :madrid catheter.  EXTREMITIES: +1 non-pitting edema, no cyanosis, no clubbing.  NEURO: AA0X , mood/ affect-, no focal neuro deficits        LABS:  CBC Full  -  ( 2020 06:27 )  WBC Count : 12.71 K/uL  RBC Count : 4.98 M/uL  Hemoglobin : 14.5 g/dL  Hematocrit : 42.7 %  Platelet Count - Automated : 297 K/uL  Mean Cell Volume : 85.7 fl  Mean Cell Hemoglobin : 29.1 pg  Mean Cell Hemoglobin Concentration : 34.0 gm/dL  Auto Neutrophil # : x  Auto Lymphocyte # : x  Auto Monocyte # : x  Auto Eosinophil # : x  Auto Basophil # : x  Auto Neutrophil % : x  Auto Lymphocyte % : x  Auto Monocyte % : x  Auto Eosinophil % : x  Auto Basophil % : x        150<H>  |  125<H>  |  19<H>  ----------------------------<  150<H>  3.1<L>   |  13<L>  |  0.91    Ca    8.5      2020 06:27  Phos  0.5       Mg     2.4         TPro  see note  /  Alb  see note  /  TBili  see note  /  DBili  x   /  AST  see note  /  ALT  see note  /  AlkPhos  see note        Urinalysis Basic - ( 2020 04:44 )    Color: Yellow / Appearance: Clear / S.025 / pH: x  Gluc: x / Ketone: Large  / Bili: Negative / Urobili: Negative   Blood: x / Protein: 100 / Nitrite: Negative   Leuk Esterase: Negative / RBC: 2-5 /HPF / WBC 0-2 /HPF   Sq Epi: x / Non Sq Epi: Occasional /HPF / Bacteria: Moderate /HPF          RADIOLOGY & ADDITIONAL STUDIES REVIEWED:     [ ]GOALS OF CARE DISCUSSION WITH PATIENT/FAMILY/PROXY:    CRITICAL CARE TIME SPENT: 35 minutes

## 2020-04-05 NOTE — PROGRESS NOTE ADULT - SUBJECTIVE AND OBJECTIVE BOX
DNR [ ]   DNI  [  ]  FULL CODE    INTERVAL HPI/OVERNIGHT EVENTS: ***Potassium remains low. replacement underway    PRESSORS: [ ] YES [x ] NO  WHICH:    ANTIBIOTICS:                  DATE STARTED:  ANTIBIOTICS:                  DATE STARTED:  ANTIBIOTICS:                  DATE STARTED:      Cardiovascular:  Heart Failure  Acute   Acute on Chronic  Chronic         Pulmonary:    Hematalogic:  heparin  Injectable 5000 Unit(s) SubCutaneous every 8 hours    Other:  acetaminophen    Suspension .. 650 milliGRAM(s) Oral every 6 hours PRN  acetaminophen  IVPB .. 1000 milliGRAM(s) IV Intermittent once  insulin regular Infusion 7 Unit(s)/Hr IV Continuous <Continuous>  lactated ringers 1000 milliLiter(s) IV Continuous <Continuous>  potassium chloride  10 mEq/100 mL IVPB 10 milliEquivalent(s) IV Intermittent every 1 hour  potassium chloride  10 mEq/100 mL IVPB 10 milliEquivalent(s) IV Intermittent every 1 hour  potassium phosphate IVPB 15 milliMole(s) IV Intermittent every 6 hours    acetaminophen    Suspension .. 650 milliGRAM(s) Oral every 6 hours PRN  acetaminophen  IVPB .. 1000 milliGRAM(s) IV Intermittent once  heparin  Injectable 5000 Unit(s) SubCutaneous every 8 hours  insulin regular Infusion 7 Unit(s)/Hr IV Continuous <Continuous>  lactated ringers 1000 milliLiter(s) IV Continuous <Continuous>  potassium chloride  10 mEq/100 mL IVPB 10 milliEquivalent(s) IV Intermittent every 1 hour  potassium chloride  10 mEq/100 mL IVPB 10 milliEquivalent(s) IV Intermittent every 1 hour  potassium phosphate IVPB 15 milliMole(s) IV Intermittent every 6 hours    Drug Dosing Weight  Height (cm): 188 (2020 05:21)  Weight (kg): 72.3 (2020 05:21)  BMI (kg/m2): 20.5 (2020 05:21)  BSA (m2): 1.97 (2020 05:21)    CENTRAL LINE: [ ] YES [x ] NO  LOCATION:   DATE INSERTED:  REMOVE: [ ] YES [ ] NO  EXPLAIN:    KAPLAN: [x ] YES [ ] NO    DATE INSERTED:  REMOVE:  [ ] YES [x ] NO  EXPLAIN: Critically ill    A-LINE:  [ ] YES [x ] NO  LOCATION:   DATE INSERTED:  REMOVE:  [ ] YES [ ] NO  EXPLAIN:    PAST MEDICAL & SURGICAL HISTORY:  HTN (hypertension)  Diabetes mellitus  No significant past surgical history        ABG - ( 2020 05:54 )  pH, Arterial: 7.43  pH, Blood: x     /  pCO2: 20    /  pO2: 55    / HCO3: 13    / Base Excess: -8.7  /  SaO2: 91                    04 @ 07:01  -   @ 07:00  --------------------------------------------------------  IN: 2056 mL / OUT: 2600 mL / NET: -544 mL            PHYSICAL EXAM:    GENERAL: NAD  HEAD:  Atraumatic, Normocephalic  EYES: EOMI, PERRLA, conjunctiva and sclera clear  ENMT: No tonsillar erythema, exudates  NECK: Supple, No JVD  NERVOUS SYSTEM:  Alert & Oriented X2, Confused. Moving all extremities. Intermittently follows commands  CHEST/LUNG: Diminished breath sounds   HEART: Regular rate and rhythm; No murmurs, rubs, or gallops  ABDOMEN: Soft, Nontender, Nondistended; Bowel sounds present  EXTREMITIES:  2+ Peripheral Pulses, No clubbing, cyanosis, or edema  LYMPH: No lymphadenopathy noted  SKIN: No rashes or lesions      LABS:  CBC Full  -  ( 2020 06:32 )  WBC Count : 10.26 K/uL  RBC Count : 5.51 M/uL  Hemoglobin : 16.0 g/dL  Hematocrit : 47.0 %  Platelet Count - Automated : 310 K/uL  Mean Cell Volume : 85.3 fl  Mean Cell Hemoglobin : 29.0 pg  Mean Cell Hemoglobin Concentration : 34.0 gm/dL  Auto Neutrophil # : x  Auto Lymphocyte # : x  Auto Monocyte # : x  Auto Eosinophil # : x  Auto Basophil # : x  Auto Neutrophil % : x  Auto Lymphocyte % : x  Auto Monocyte % : x  Auto Eosinophil % : x  Auto Basophil % : x    04-05    151<H>  |  119<H>  |  15  ----------------------------<  284<H>  2.8<LL>   |  20<L>  |  0.98    Ca    8.8      2020 06:32  Phos  0.8     -  Mg     2.2     -    TPro  7.9  /  Alb  2.5<L>  /  TBili  0.5  /  DBili  x   /  AST  34  /  ALT  37  /  AlkPhos  94  04-05      Urinalysis Basic - ( 2020 04:44 )    Color: Yellow / Appearance: Clear / S.025 / pH: x  Gluc: x / Ketone: Large  / Bili: Negative / Urobili: Negative   Blood: x / Protein: 100 / Nitrite: Negative   Leuk Esterase: Negative / RBC: 2-5 /HPF / WBC 0-2 /HPF   Sq Epi: x / Non Sq Epi: Occasional /HPF / Bacteria: Moderate /HPF            [  ]  DVT Prophylaxis  [  ]  Nutrition, Brand, Rate         Goal Rate         Abdominal Nutritional Status -  Malnutrition   Cachexia      Morbid Obesity BMI >/=40    RADIOLOGY & ADDITIONAL STUDIES:  ***    [  ] Goals of Care Discussion with Family/Proxy/Other           Elements of Conversation Discussed: Patient/Family understanding of current illness   Advanced Directives                                                                       Prognosis  Treatment Options  Care Aligned with patient's wishes                                             TIME SPENT: 35 minutes DNR [ ]   DNI  [  ]  FULL CODE    INTERVAL HPI/OVERNIGHT EVENTS: ***Potassium remains low. replacement underway    PRESSORS: [ ] YES [x ] NO  WHICH:    ANTIBIOTICS:                  DATE STARTED:  ANTIBIOTICS:                  DATE STARTED:  ANTIBIOTICS:                  DATE STARTED:      Cardiovascular:  Heart Failure  Acute   Acute on Chronic  Chronic         Pulmonary:    Hematalogic:  heparin  Injectable 5000 Unit(s) SubCutaneous every 8 hours    Other:  acetaminophen    Suspension .. 650 milliGRAM(s) Oral every 6 hours PRN  acetaminophen  IVPB .. 1000 milliGRAM(s) IV Intermittent once  insulin regular Infusion 7 Unit(s)/Hr IV Continuous <Continuous>  lactated ringers 1000 milliLiter(s) IV Continuous <Continuous>  potassium chloride  10 mEq/100 mL IVPB 10 milliEquivalent(s) IV Intermittent every 1 hour  potassium chloride  10 mEq/100 mL IVPB 10 milliEquivalent(s) IV Intermittent every 1 hour  potassium phosphate IVPB 15 milliMole(s) IV Intermittent every 6 hours    acetaminophen    Suspension .. 650 milliGRAM(s) Oral every 6 hours PRN  acetaminophen  IVPB .. 1000 milliGRAM(s) IV Intermittent once  heparin  Injectable 5000 Unit(s) SubCutaneous every 8 hours  insulin regular Infusion 7 Unit(s)/Hr IV Continuous <Continuous>  lactated ringers 1000 milliLiter(s) IV Continuous <Continuous>  potassium chloride  10 mEq/100 mL IVPB 10 milliEquivalent(s) IV Intermittent every 1 hour  potassium chloride  10 mEq/100 mL IVPB 10 milliEquivalent(s) IV Intermittent every 1 hour  potassium phosphate IVPB 15 milliMole(s) IV Intermittent every 6 hours    Drug Dosing Weight  Height (cm): 188 (2020 05:21)  Weight (kg): 72.3 (2020 05:21)  BMI (kg/m2): 20.5 (2020 05:21)  BSA (m2): 1.97 (2020 05:21)    CENTRAL LINE: [ ] YES [x ] NO  LOCATION:   DATE INSERTED:  REMOVE: [ ] YES [ ] NO  EXPLAIN:    KAPLAN: [x ] YES [ ] NO    DATE INSERTED:  REMOVE:  [ ] YES [x ] NO  EXPLAIN: Critically ill    A-LINE:  [ ] YES [x ] NO  LOCATION:   DATE INSERTED:  REMOVE:  [ ] YES [ ] NO  EXPLAIN:    PAST MEDICAL & SURGICAL HISTORY:  HTN (hypertension)  Diabetes mellitus  No significant past surgical history        ABG - ( 2020 05:54 )  pH, Arterial: 7.43  pH, Blood: x     /  pCO2: 20    /  pO2: 55    / HCO3: 13    / Base Excess: -8.7  /  SaO2: 91                    04 @ 07:01  -   @ 07:00  --------------------------------------------------------  IN: 2056 mL / OUT: 2600 mL / NET: -544 mL            PHYSICAL EXAM:    GENERAL: NAD  HEAD:  Atraumatic, Normocephalic  EYES: EOMI, PERRLA, conjunctiva and sclera clear  ENMT: No tonsillar erythema, exudates  NECK: Supple, No JVD  NERVOUS SYSTEM:  Alert & Oriented X2, Confused. Moving all extremities. Intermittently follows commands  CHEST/LUNG: Diminished breath sounds   HEART: Regular rate and rhythm; No murmurs, rubs, or gallops  ABDOMEN: Soft, Nontender, Nondistended; Bowel sounds present  EXTREMITIES:  2+ Peripheral Pulses, No clubbing, cyanosis, or edema  LYMPH: No lymphadenopathy noted  SKIN: No rashes or lesions      LABS:  CBC Full  -  ( 2020 06:32 )  WBC Count : 10.26 K/uL  RBC Count : 5.51 M/uL  Hemoglobin : 16.0 g/dL  Hematocrit : 47.0 %  Platelet Count - Automated : 310 K/uL  Mean Cell Volume : 85.3 fl  Mean Cell Hemoglobin : 29.0 pg  Mean Cell Hemoglobin Concentration : 34.0 gm/dL  Auto Neutrophil # : x  Auto Lymphocyte # : x  Auto Monocyte # : x  Auto Eosinophil # : x  Auto Basophil # : x  Auto Neutrophil % : x  Auto Lymphocyte % : x  Auto Monocyte % : x  Auto Eosinophil % : x  Auto Basophil % : x    04-05    151<H>  |  119<H>  |  15  ----------------------------<  284<H>  2.8<LL>   |  20<L>  |  0.98    Ca    8.8      2020 06:32  Phos  0.8     -  Mg     2.2     -    TPro  7.9  /  Alb  2.5<L>  /  TBili  0.5  /  DBili  x   /  AST  34  /  ALT  37  /  AlkPhos  94  04-05      Urinalysis Basic - ( 2020 04:44 )    Color: Yellow / Appearance: Clear / S.025 / pH: x  Gluc: x / Ketone: Large  / Bili: Negative / Urobili: Negative   Blood: x / Protein: 100 / Nitrite: Negative   Leuk Esterase: Negative / RBC: 2-5 /HPF / WBC 0-2 /HPF   Sq Epi: x / Non Sq Epi: Occasional /HPF / Bacteria: Moderate /HPF            [  ]  DVT Prophylaxis  [  ]  Nutrition, Brand, Rate         Goal Rate         Abdominal Nutritional Status -  Malnutrition   Cachexia      Morbid Obesity BMI >/=40    RADIOLOGY & ADDITIONAL STUDIES:  ***  < from: Xray Chest 1 View-PORTABLE IMMEDIATE (20 @ 21:25) >  Cardiac silhouette and mediastinum are within normal limits. The lung apices a partially secured by the patient's chin. Patchy right hilar opacity is seen. Left lung is grossly clear. No significant pleural effusion or gross pneumothorax. Suboptimal evaluation of the thoracic spine. Otherwise bony thorax is intact.    IMPRESSION: Patchy right hilar infiltrate.      < end of copied text >    [  ] Goals of Care Discussion with Family/Proxy/Other           Elements of Conversation Discussed: Patient/Family understanding of current illness   Advanced Directives                                                                       Prognosis  Treatment Options  Care Aligned with patient's wishes                                             TIME SPENT: 35 minutes

## 2020-04-06 NOTE — DIETITIAN INITIAL EVALUATION ADULT. - PERTINENT MEDS FT
MEDICATIONS  (STANDING):  dextrose 5%. 1000 milliLiter(s) (50 mL/Hr) IV Continuous <Continuous>  heparin  Injectable 5000 Unit(s) SubCutaneous every 8 hours  insulin glargine Injectable (LANTUS) 20 Unit(s) SubCutaneous every morning  insulin lispro (HumaLOG) corrective regimen sliding scale   SubCutaneous every 6 hours    MEDICATIONS  (PRN):  glucagon  Injectable 1 milliGRAM(s) IntraMuscular once PRN Glucose LESS THAN 70 milligrams/deciliter

## 2020-04-06 NOTE — PROGRESS NOTE ADULT - SUBJECTIVE AND OBJECTIVE BOX
INTERVAL /OVERNIGHT EVENTS:     PRESSORS: [ ] YES [ ] NO  WHICH:    ANTIBIOTICS:                  DATE STARTED:  ANTIBIOTICS:                  DATE STARTED:  ANTIBIOTICS:                  DATE STARTED:    Antimicrobial:    Cardiovascular:  phenylephrine    Infusion 0.5 MICROgram(s)/kG/Min IV Continuous <Continuous>    Pulmonary:    Hematalogic:  heparin  Injectable 5000 Unit(s) SubCutaneous every 8 hours    Other:  acetaminophen    Suspension .. 650 milliGRAM(s) Oral every 6 hours PRN  dextrose 40% Gel 15 Gram(s) Oral once PRN  dextrose 5% + sodium chloride 0.45% with potassium chloride 20 mEq/L 1000 milliLiter(s) IV Continuous <Continuous>  dextrose 5%. 1000 milliLiter(s) IV Continuous <Continuous>  dextrose 50% Injectable 12.5 Gram(s) IV Push once  dextrose 50% Injectable 25 Gram(s) IV Push once  dextrose 50% Injectable 25 Gram(s) IV Push once  glucagon  Injectable 1 milliGRAM(s) IntraMuscular once PRN  insulin glargine Injectable (LANTUS) 20 Unit(s) SubCutaneous every morning  insulin lispro (HumaLOG) corrective regimen sliding scale   SubCutaneous three times a day before meals  insulin regular Infusion 9 Unit(s)/Hr IV Continuous <Continuous>    acetaminophen    Suspension .. 650 milliGRAM(s) Oral every 6 hours PRN  dextrose 40% Gel 15 Gram(s) Oral once PRN  dextrose 5% + sodium chloride 0.45% with potassium chloride 20 mEq/L 1000 milliLiter(s) IV Continuous <Continuous>  dextrose 5%. 1000 milliLiter(s) IV Continuous <Continuous>  dextrose 50% Injectable 12.5 Gram(s) IV Push once  dextrose 50% Injectable 25 Gram(s) IV Push once  dextrose 50% Injectable 25 Gram(s) IV Push once  glucagon  Injectable 1 milliGRAM(s) IntraMuscular once PRN  heparin  Injectable 5000 Unit(s) SubCutaneous every 8 hours  insulin glargine Injectable (LANTUS) 20 Unit(s) SubCutaneous every morning  insulin lispro (HumaLOG) corrective regimen sliding scale   SubCutaneous three times a day before meals  insulin regular Infusion 9 Unit(s)/Hr IV Continuous <Continuous>  phenylephrine    Infusion 0.5 MICROgram(s)/kG/Min IV Continuous <Continuous>    Drug Dosing Weight  Height (cm): 188 (04 Apr 2020 05:21)  Weight (kg): 72.3 (04 Apr 2020 05:21)  BMI (kg/m2): 20.5 (04 Apr 2020 05:21)  BSA (m2): 1.97 (04 Apr 2020 05:21)    CENTRAL LINE: [ ] YES [ ] NO  LOCATION:   DATE INSERTED:  REMOVE: [ ] YES [ ] NO  EXPLAIN:    MADRID: [ ] YES [ ] NO    DATE INSERTED:  REMOVE:  [ ] YES [ ] NO  EXPLAIN:    A-LINE:  [ ] YES [ ] NO  LOCATION:   DATE INSERTED:  REMOVE:  [ ] YES [ ] NO  EXPLAIN:    PMH -reviewed admission note, no change since admission  Heart faliure: acute [ ] chronic [ ] acute or chronic [ ] diastolic [ ] systolic [ ] combied systolic and diastolic[ ]  SHAW: ATN[ ] renal medullary necrosis [ ] CKD I [ ]CKDII [ ]CKD III [ ]CKD IV [ ]CKD V [ ]Other pathological lesions [ ]  Abdominal Nutrition Status: malnutrition [ ] cachexia [ ] morbid obesity/BMI=40 [ ] Supplement ordered [___________]     ICU Vital Signs Last 24 Hrs  T(C): 37.8 (06 Apr 2020 05:38), Max: 37.8 (06 Apr 2020 00:00)  T(F): 100 (06 Apr 2020 05:38), Max: 100.1 (06 Apr 2020 00:00)  HR: 134 (06 Apr 2020 05:38) (88 - 136)  BP: 104/72 (06 Apr 2020 05:38) (104/72 - 158/100)  BP(mean): 114 (06 Apr 2020 00:00) (94 - 114)  ABP: --  ABP(mean): --  RR: 40 (06 Apr 2020 05:38) (35 - 41)  SpO2: 90% (06 Apr 2020 05:38) (89% - 98%)      ABG - ( 06 Apr 2020 00:54 )  pH, Arterial: 7.35  pH, Blood: x     /  pCO2: 17    /  pO2: 68    / HCO3: 9     / Base Excess: -14.2 /  SaO2: 93                    04-05 @ 07:01  -  04-06 @ 07:00  --------------------------------------------------------  IN: 2224 mL / OUT: 1200 mL / NET: 1024 mL            PHYSICAL EXAM:    GENERAL:   HEAD: atraumatic, normocephalic   EYES: PERRLA, white sclera.   ENMT: nasal mucosa- Oral cavity-   NECK: supple, JVD/ no JVD, thyroid-   LYMPH: no palpable lymph nodes     SKIN: warm, dry   CHEST/LUNG: ET tube: size        cm at lip. No Chest deformity , no chest tenderness. bilateral breath sounds,no  adventitious sounds  HEART: RRR, no m/r/g   ABDOMEN: soft, nontender, nondistended; bowel sounds.  :madrid catheter.  EXTREMITIES: +1 non-pitting edema, no cyanosis, no clubbing.  NEURO: not intubated, not sedated, on 100% non-rebreather mask        LABS:  CBC Full  -  ( 06 Apr 2020 06:55 )  WBC Count : x  RBC Count : 5.13 M/uL  Hemoglobin : 15.0 g/dL  Hematocrit : 45.5 %  Platelet Count - Automated : 322 K/uL  Mean Cell Volume : 88.7 fl  Mean Cell Hemoglobin : 29.2 pg  Mean Cell Hemoglobin Concentration : 33.0 gm/dL  Auto Neutrophil # : x  Auto Lymphocyte # : x  Auto Monocyte # : x  Auto Eosinophil # : x  Auto Basophil # : x  Auto Neutrophil % : x  Auto Lymphocyte % : x  Auto Monocyte % : x  Auto Eosinophil % : x  Auto Basophil % : x    04-06    158<H>  |  126<H>  |  25<H>  ----------------------------<  264<H>  3.0<L>   |  19<L>  |  1.54<H>    Ca    8.8      06 Apr 2020 06:55  Phos  2.3     04-06  Mg     2.2     04-06    TPro  7.9  /  Alb  2.5<L>  /  TBili  0.5  /  DBili  x   /  AST  34  /  ALT  37  /  AlkPhos  94  04-05        Culture Results:   <10,000 CFU/mL Normal Urogenital Isa (04-04 @ 16:29)  Culture Results:   No growth to date. (04-04 @ 10:58)  Culture Results:   No growth to date. (04-04 @ 10:58)      RADIOLOGY & ADDITIONAL STUDIES REVIEWED:     [ ]GOALS OF CARE DISCUSSION WITH PATIENT/FAMILY/PROXY:    CRITICAL CARE TIME SPENT: 35 minutes

## 2020-04-06 NOTE — PROGRESS NOTE ADULT - ASSESSMENT
Mr. Radford is a pleasant 65 year old  male presenting wit DKA in the setting of pneumonia  COVID test negative x 2 (4/2 and 4/5)      Neuro: avoid sedation   Respiratory: on non rebreather, in respiratory distress, low threshold to intubate.  abg now7.35/17/68/9/93% on 100% non-rebreather mask.  resp. alkalosis, metabolic acidosis compensated w/ low end normal ph.  cxr demonstrates R >L pna  CV: MAP goal > 65 mmhg on low dose phenylephrine  Endocrine: DKA, on insulin drip, keep k >3.5,moniotr BMP and VBG, AG=19  LR +KCL   Heme/Onc: heparin sc          Renal:  d5 1/2NS +20meq KCL @75cc/hr, SHAW noted w/ Cr 1.54  ID: afebrile.  GI: NPO for now

## 2020-04-06 NOTE — PROCEDURE NOTE - NSPROCDETAILS_GEN_ALL_CORE
ultrasound guidance/sutured in place/connected to a pressurized flush line/hemostasis with direct pressure, dressing applied/Seldinger technique/all materials/supplies accounted for at end of procedure/location identified, draped/prepped, sterile technique used, needle inserted/introduced/positive blood return obtained via catheter
lumen(s) aspirated and flushed/guidewire recovered/sterile dressing applied/sterile technique, catheter placed/ultrasound guidance

## 2020-04-06 NOTE — PROGRESS NOTE ADULT - SUBJECTIVE AND OBJECTIVE BOX
HPI:  Mr. Radford is a pleasant 65 year old  male w/ PMH NIDDM (used to take oral medications, now noncompliant) p/w malaise and increased thirst distress - Hx somewhat limited but able to express 2 weeks of complaints and presented to ED 2 days prior for increased urination for past several weeks/months, generalized fatigue, along w/ a mechanical fall - discharged w/ Metformin and Amlodipine. Currently patient denied any fever, chest pain, cough, dyspnea, or any other complaints. In the ED patient seen in moderate respiratory distress w/ accessory muscle use and labs consistent w/ moderate DKA (pH 7.21, AG 24, CO2 9, but alert and drowsy). (03 Apr 2020 23:03)    EVENTS: insulin drip, respiratory distress         ICU Vital Signs Last 24 Hrs  T(C): 37.8 (06 Apr 2020 00:00), Max: 37.9 (05 Apr 2020 08:00)  T(F): 100.1 (06 Apr 2020 00:00), Max: 100.2 (05 Apr 2020 08:00)  HR: 136 (06 Apr 2020 00:00) (88 - 136)  BP: 158/100 (06 Apr 2020 00:00) (128/82 - 158/100)  BP(mean): 114 (06 Apr 2020 00:00) (94 - 114)  ABP: --  ABP(mean): --  RR: 41 (06 Apr 2020 00:00) (30 - 41)  SpO2: 89% (06 Apr 2020 00:00) (89% - 98%)     04-04 @ 07:01 - 04-05 @ 07:00  --------------------------------------------------------  IN: 2056 mL / OUT: 2600 mL / NET: -544 mL    04-05 @ 07:01  -  04-06 @ 04:23  --------------------------------------------------------  IN: 2224 mL / OUT: 1200 mL / NET: 1024 mL                             16.0   10.26 )-----------( 310      ( 05 Apr 2020 06:32 )             47.0    04-05    152<H>  |  123<H>  |  23<H>  ----------------------------<  386<H>  3.8   |  10<LL>  |  1.12    Ca    8.7      05 Apr 2020 23:39  Phos  0.8     04-05  Mg     2.2     04-05    TPro  7.9  /  Alb  2.5<L>  /  TBili  0.5  /  DBili  x   /  AST  34  /  ALT  37  /  AlkPhos  94  04-05   ABG - ( 06 Apr 2020 00:54 )  pH, Arterial: 7.35  pH, Blood: x     /  pCO2: 17    /  pO2: 68    / HCO3: 9     / Base Excess: -14.2 /  SaO2: 93                Hemoglobin A1C, Whole Blood: >15.5 % (04-05 @ 14:37)         PHYSICAL EXAM:    General: No Acute Distress     Neurological: obtubed, still wakes up with mild physical stimulation     Pulmonary: crackles     Cardiovascular: S1, S2, Regular Rate and Rhythm     Gastrointestinal: Soft, Nontender, Nondistended     Extremities: No calf tenderness     Incision:       MEDICATIONS:  Antibiotics:      Neurological:   acetaminophen    Suspension .. 650 milliGRAM(s) Oral every 6 hours PRN    Cardiac:   phenylephrine    Infusion 0.5 MICROgram(s)/kG/Min IV Continuous <Continuous>    Pulm:    Heme:   heparin  Injectable 5000 Unit(s) SubCutaneous every 8 hours    Other:   insulin regular Infusion 9 Unit(s)/Hr IV Continuous <Continuous>  lactated ringers 1000 milliLiter(s) IV Continuous <Continuous>  potassium phosphate IVPB 15 milliMole(s) IV Intermittent every 6 hours       DEVICES: [] Restraints [] SOO/HMV []LD [] ET tube [] Trach [] Chest Tube [] A-line [] Barclay [] NGT [] Rectal Tube       A/P:  HPI:  Mr. Radford is a pleasant 65 year old  male presenting wit DKA in the setting of pneumonia  COVIS x1 negative      Neuro: avoid sedation   Respiratory: on non rebreather, in respiratory distress, low threshold to intubate  CV: MAP goal > 65 mmhg   Endocrine: DKA, on insulin drip, keep k >3.5,moniotr BMP and VBG   LR +KCL   Heme/Onc: heparin sc          Renal: LR   ID: afebrile, covid second test pending   GI: NPO for now     Code Status: [x] Full Code [] DNR [] DNI [] Goals of Care:   Disposition: [x] ICU [] Stroke Unit [] RCU []PCU []Floor [] Discharge Home     Patient at high risk for neurologic deterioration, critical care time, excluding procedures: 35 minutes

## 2020-04-06 NOTE — DIETITIAN INITIAL EVALUATION ADULT. - PERTINENT LABORATORY DATA
04-06 Na161 mmol/L<HH> Glu 151 mg/dL<H> K+ 3.7 mmol/L Cr  1.27 mg/dL BUN 24 mg/dL<H> 04-06 Phos 2.5 mg/dL 04-05 Alb 2.5 g/dL<L> 04-05 [id number] >15.5 %<H>

## 2020-04-07 NOTE — GOALS OF CARE CONVERSATION - ADVANCED CARE PLANNING - CONVERSATION DETAILS
PC SW and PC NP contacted patient's son Quan Radford ([email or web address]).  Mr. Radford was provided a clinical update and provided a functional status and illness review.  Patient was working as a  up until approximately two weeks ago.  According to his son, the patient resides in an apartment and appeared to be compliant with diabetes medication, however the apartment was not well kept and he suspects the patient may not have been compliant with medications and seemed to be demonstrating signs of dementia.  Patient has lost weight since February and seems to have had a mental and functional decline in a short period of time as described by his son.  Mr. Radford expressed concern regarding the patient's needs in the future/level of care.  PC Sw and NP validated ' concern and suggested he focus upon this admission and provided the patient recovers the primary team will assist with discharge planning needs. Patient is currently on a NRB, therefore PC NP inquired about code status.  Mr. Radford stated he does believe his father would want  to be kept alive " on machines," however he wishes to discuss this with his mother.  Contact information was provided and  stated he will follow up with PC team.  Patient and his wife are , therefore his son is the primary contact [phone].  Patient remains a full code at this time.

## 2020-04-07 NOTE — DISCHARGE NOTE PROVIDER - NSDCCPCAREPLAN_GEN_ALL_CORE_FT
PRINCIPAL DISCHARGE DIAGNOSIS  Diagnosis: DKA (diabetic ketoacidoses)  Assessment and Plan of Treatment: Found to have diabetic ketoacidosis on admission. In the ED, you were seen in moderate respiratory distress and labs consistent with moderate DKA. Anion Gap was 24, Glucose was 453, and bicarbonate was 9. You were managed with insulin for diabetic ketoacidosis.      SECONDARY DISCHARGE DIAGNOSES  Diagnosis: PNA (pneumonia)  Assessment and Plan of Treatment: You were found to be in moderate respiratory distress. Chest X Ray showed bilateral infiltrates with infiltrates more on the right side as compared to left side. You were negative for COVID 19. You were treated with antibiotics rocephin and flagyl for possible aspiration pneumonia.    Diagnosis: Hypernatremia  Assessment and Plan of Treatment: You were found to have high sodium levels. You were given dextrose iv fluids and free water. You are encouraged to drink fluids.

## 2020-04-07 NOTE — PROGRESS NOTE ADULT - SUBJECTIVE AND OBJECTIVE BOX
INTERVAL HPI/OVERNIGHT EVENTS: Pt was desaturating to low 80s on 5 L NC. Pt put back on NRB this morning. Started on antibiotics for aspiration pneumonia.     PRESSORS: [ ] YES [x ] NO    Antimicrobial:  cefTRIAXone   IVPB      metroNIDAZOLE  IVPB 500 milliGRAM(s) IV Intermittent once  metroNIDAZOLE  IVPB      metroNIDAZOLE  IVPB 500 milliGRAM(s) IV Intermittent every 8 hours    Cardiovascular:    Pulmonary:    Hematalogic:  heparin  Injectable 5000 Unit(s) SubCutaneous every 8 hours    Other:  dextrose 5% 1000 milliLiter(s) IV Continuous <Continuous>  dextrose 5%. 1000 milliLiter(s) IV Continuous <Continuous>  glucagon  Injectable 1 milliGRAM(s) IntraMuscular once PRN  haloperidol    Injectable 2 milliGRAM(s) IntraMuscular every 6 hours PRN  insulin glargine Injectable (LANTUS) 20 Unit(s) SubCutaneous every morning  insulin lispro (HumaLOG) corrective regimen sliding scale   SubCutaneous every 6 hours  potassium chloride   Powder 40 milliEquivalent(s) Oral once    cefTRIAXone   IVPB      dextrose 5% 1000 milliLiter(s) IV Continuous <Continuous>  dextrose 5%. 1000 milliLiter(s) IV Continuous <Continuous>  glucagon  Injectable 1 milliGRAM(s) IntraMuscular once PRN  haloperidol    Injectable 2 milliGRAM(s) IntraMuscular every 6 hours PRN  heparin  Injectable 5000 Unit(s) SubCutaneous every 8 hours  insulin glargine Injectable (LANTUS) 20 Unit(s) SubCutaneous every morning  insulin lispro (HumaLOG) corrective regimen sliding scale   SubCutaneous every 6 hours  metroNIDAZOLE  IVPB 500 milliGRAM(s) IV Intermittent once  metroNIDAZOLE  IVPB      metroNIDAZOLE  IVPB 500 milliGRAM(s) IV Intermittent every 8 hours  potassium chloride   Powder 40 milliEquivalent(s) Oral once    Drug Dosing Weight  Height (cm): 188 (04 Apr 2020 05:21)  Weight (kg): 72.3 (04 Apr 2020 05:21)  BMI (kg/m2): 20.5 (04 Apr 2020 05:21)  BSA (m2): 1.97 (04 Apr 2020 05:21)    CENTRAL LINE: [x ] YES [ ] NO  LOCATION:   DATE INSERTED:  REMOVE: [ ] YES [ ] NO  EXPLAIN:    MADRID: [x ] YES [ ] NO    DATE INSERTED:  REMOVE:  [ ] YES [ ] NO  EXPLAIN:    A-LINE:  [ x] YES [ ] NO  LOCATION:   DATE INSERTED:  REMOVE:  [ ] YES [ ] NO  EXPLAIN:    PMH -reviewed admission note, no change since admission      ABG - ( 06 Apr 2020 22:35 )  pH, Arterial: 7.46  pH, Blood: x     /  pCO2: 28    /  pO2: 59    / HCO3: 20    / Base Excess: -2.3  /  SaO2: 91                    04-06 @ 07:01  -  04-07 @ 07:00  --------------------------------------------------------  IN: 515 mL / OUT: 1575 mL / NET: -1060 mL            PHYSICAL EXAM:  HEAD: atraumatic, normocephalic   EYES: PERRLA, white sclera.   ENMT: nasal mucosa- Oral cavity-   NECK: supple, JVD/ no JVD, thyroid-   LYMPH: no palpable lymph nodes     SKIN: warm, dry   CHEST/LUNG: No Chest deformity , no chest tenderness. bilateral breath sounds,no  adventitious sounds  HEART: RRR, no m/r/g   ABDOMEN: soft, nontender, nondistended; bowel sounds.  :madrid catheter.  EXTREMITIES: +1 non-pitting edema, no cyanosis, no clubbing.  NEURO: Demented AAOX 0, does not follow commands        LABS:  CBC Full  -  ( 06 Apr 2020 06:55 )  WBC Count : 13.57 K/uL  RBC Count : 5.13 M/uL  Hemoglobin : 15.0 g/dL  Hematocrit : 45.5 %  Platelet Count - Automated : 322 K/uL  Mean Cell Volume : 88.7 fl  Mean Cell Hemoglobin : 29.2 pg  Mean Cell Hemoglobin Concentration : 33.0 gm/dL  Auto Neutrophil # : 11.53 K/uL  Auto Lymphocyte # : 1.64 K/uL  Auto Monocyte # : 0.19 K/uL  Auto Eosinophil # : 0.01 K/uL  Auto Basophil # : 0.03 K/uL  Auto Neutrophil % : 84.9 %  Auto Lymphocyte % : 12.1 %  Auto Monocyte % : 1.4 %  Auto Eosinophil % : 0.1 %  Auto Basophil % : 0.2 %    04-06    159<H>  |  129<H>  |  24<H>  ----------------------------<  362<H>  3.3<L>   |  20<L>  |  1.08    Ca    8.5      06 Apr 2020 22:44  Phos  2.5     04-06  Mg     2.2     04-06          Culture Results:   <10,000 CFU/mL Normal Urogenital Isa (04-04 @ 16:29)  Culture Results:   No growth to date. (04-04 @ 10:58)  Culture Results:   No growth to date. (04-04 @ 10:58)      RADIOLOGY & ADDITIONAL STUDIES REVIEWED:  ***    [ ]GOALS OF CARE DISCUSSION WITH PATIENT/FAMILY/PROXY:    CRITICAL CARE TIME SPENT: 35 minutes

## 2020-04-07 NOTE — PROGRESS NOTE ADULT - ASSESSMENT
Mr. Radford is a pleasant 65 year old  male presenting wit DKA in the setting of pneumonia  COVID test negative x 2 (4/2 and 4/5)      Neuro:  AAOX0 , does not follow commands  Avoid sedation     Respiratory:   Pt was desaturating to low 80s on 5 L NC. Pt put back on NRB this morning.  on non rebreather, in respiratory distress, low threshold to intubate.  f/u ABG  cxr demonstrates R >L pna    CV:   Pt is normotensive; off pressors   No other active issues     Endocrine:  DKA resolved       Renal:    Hypernatremia: started on D5 1/2NS +20meq KCL @75cc/hr  f/u serial BMP q6    SHAW: Resolved     ID:  CXR suspicious for Aspiration pneumonia  started on Rocephin and flagyl    GI:  NPO for now

## 2020-04-07 NOTE — DISCHARGE NOTE PROVIDER - HOSPITAL COURSE
Mr. Radford is a pleasant 65 year old  male w/ PMH NIDDM (used to take oral medications, now noncompliant) p/w malaise and increased thirst - Hx somewhat limited but able to express 2 weeks of complaints and presented to ED 2 days prior for increased urination for past several weeks/months, generalized fatigue, along w/ a mechanical fall - discharged w/ Metformin and Amlodipine. In the ED patient seen in moderate respiratory distress w/ accessory muscle use and labs consistent w/ moderate DKA (pH 7.21, AG 24, CO2 9, but alert and drowsy).    Pt was found to have DKA  2/2 noncompliance; AG 24, Glc 453, and HCO3 9. Pt was managed for DKA.     Pt was found to have Acute hypoxic respiratory distress. Pt was put on Nonrebreather for hypoxia and was saturating >905 on NRB.  COVID was negative X2. CXR showed bilateral infiltrates R>L suspicious for Aspiration pneumonia. Pt was started on rocephin and flagyl.     Pt was also found to have hypernatremia and was give D5 and free water.     Pt will be transferred to another facility.

## 2020-04-07 NOTE — GOALS OF CARE CONVERSATION - ADVANCED CARE PLANNING - WHAT MATTERS MOST
Patient's son expressed hope that his father will improve.  In addition, Quan Radford stated he wants to discuss advance care planning with his mother and follow up with this team.

## 2020-04-07 NOTE — CHART NOTE - NSCHARTNOTEFT_GEN_A_CORE
Palliative Care Note:    Patient is a 64 y/o M from home with PMH DM, dementia. Patient admitted to ICU for DKA 2/2 noncompliance and hypoxic respiratory failure 2/2 aspiration PNA - failed on 5L NC, now on NRB. Request to establish goals of care.    Spoke with son/Quan ([phone]) on the phone with Palliative SW. Discussed status. Per son, patient lives alone and has exhibited a significant mental/functional decline and weight loss since February 2020. Son reports "house was mess." He expressed concern patient is now unable to live on his own and will need 24 hr care to ensure safety and medical compliance. Son reports + family history of dementia. Patient admitted to the hospital 2X within 1 week. Discussed risks vs benefits of resuscitation, intubation. Son wants to further speak with his mother before making any decisions but reports patient would likely not want to be on permanent life support. All questions answered; supportive counseling provided. At this time, patient remains a full code.

## 2020-04-07 NOTE — GOALS OF CARE CONVERSATION - ADVANCED CARE PLANNING - PARTICIPANTS
[facility] notes are scanned into media    Family.../Staff.../patient clinically unable to participate

## 2020-04-07 NOTE — DISCHARGE NOTE PROVIDER - NSDCMRMEDTOKEN_GEN_ALL_CORE_FT
cefTRIAXone:   Dextrose 5% in Water intravenous solution: 1000 milliliter(s) intravenous   Dextrose 5% in Water intravenous solution: 1000 milliliter(s) intravenous   haloperidol:   heparin:   insulin glargine:   metroNIDAZOLE 500 mg/100 mL intravenous solution:  intravenous

## 2020-04-08 NOTE — PROGRESS NOTE ADULT - ASSESSMENT
Mr. Radford is a pleasant 65 year old  male presenting wit DKA in the setting of pneumonia  COVID test negative x 2 (4/2 and 4/5)      Neuro:  AAOX0 , does not follow commands  Avoid sedation     Respiratory:   Pt was saturating fineon NRB  on non rebreather, in respiratory distress, low threshold to intubate.  cxr demonstrates R >L pna    CV:   Pt is normotensive; off pressors   No other active issues     Endocrine:  DKA resolved       Renal:    Hypernatremia: started on D5 1/2NS +20meq KCL and free water 300 q4  f/u BMP   SHAW: Resolved     ID:  CXR suspicious for Aspiration pneumonia  started on Rocephin and flagyl    GI:  NPO for now

## 2020-04-08 NOTE — PROGRESS NOTE ADULT - SUBJECTIVE AND OBJECTIVE BOX
INTERVAL HPI/OVERNIGHT EVENTS: Patient seen and examined at bedside. Pt saturating 95-96% on NRB.     PRESSORS: [ ] YES [x ] NO    Antimicrobial:  cefTRIAXone   IVPB      cefTRIAXone   IVPB 1000 milliGRAM(s) IV Intermittent every 24 hours  metroNIDAZOLE  IVPB      metroNIDAZOLE  IVPB 500 milliGRAM(s) IV Intermittent every 8 hours    Cardiovascular:    Pulmonary:    Hematalogic:  heparin  Injectable 5000 Unit(s) SubCutaneous every 8 hours    Other:  dextrose 5%. 1000 milliLiter(s) IV Continuous <Continuous>  glucagon  Injectable 1 milliGRAM(s) IntraMuscular once PRN  haloperidol    Injectable 2 milliGRAM(s) IntraMuscular every 6 hours PRN  insulin glargine Injectable (LANTUS) 20 Unit(s) SubCutaneous every morning  insulin lispro (HumaLOG) corrective regimen sliding scale   SubCutaneous every 6 hours  multivitamin 1 Tablet(s) Oral daily  sodium chloride 0.225%. 1000 milliLiter(s) IV Continuous <Continuous>  thiamine IVPB 500 milliGRAM(s) IV Intermittent daily    cefTRIAXone   IVPB      cefTRIAXone   IVPB 1000 milliGRAM(s) IV Intermittent every 24 hours  dextrose 5%. 1000 milliLiter(s) IV Continuous <Continuous>  glucagon  Injectable 1 milliGRAM(s) IntraMuscular once PRN  haloperidol    Injectable 2 milliGRAM(s) IntraMuscular every 6 hours PRN  heparin  Injectable 5000 Unit(s) SubCutaneous every 8 hours  insulin glargine Injectable (LANTUS) 20 Unit(s) SubCutaneous every morning  insulin lispro (HumaLOG) corrective regimen sliding scale   SubCutaneous every 6 hours  metroNIDAZOLE  IVPB      metroNIDAZOLE  IVPB 500 milliGRAM(s) IV Intermittent every 8 hours  multivitamin 1 Tablet(s) Oral daily  sodium chloride 0.225%. 1000 milliLiter(s) IV Continuous <Continuous>  thiamine IVPB 500 milliGRAM(s) IV Intermittent daily    Drug Dosing Weight  Height (cm): 188 (04 Apr 2020 05:21)  Weight (kg): 72.3 (04 Apr 2020 05:21)  BMI (kg/m2): 20.5 (04 Apr 2020 05:21)  BSA (m2): 1.97 (04 Apr 2020 05:21)    CENTRAL LINE: [ ] YES [ x] NO  LOCATION:   DATE INSERTED:  REMOVE: [ ] YES [ ] NO  EXPLAIN:    KAPLAN: [x ] YES [ ] NO    DATE INSERTED:  REMOVE:  [ ] YES [ ] NO  EXPLAIN:    A-LINE:  [ ] YES [ x] NO  LOCATION:   DATE INSERTED:  REMOVE:  [ ] YES [ ] NO  EXPLAIN:    PMH -reviewed admission note, no change since admission      ABG - ( 06 Apr 2020 22:35 )  pH, Arterial: 7.46  pH, Blood: x     /  pCO2: 28    /  pO2: 59    / HCO3: 20    / Base Excess: -2.3  /  SaO2: 91                    04-07 @ 07:01  -  04-08 @ 07:00  --------------------------------------------------------  IN: 200 mL / OUT: 300 mL / NET: -100 mL            PHYSICAL EXAM:    GENERAL: Lethargic  HEAD:  Atraumatic, Normocephalic  EYES: EOMI, PERRLA, conjunctiva and sclera clear  ENMT: No tonsillar erythema, exudates, or enlargement; Moist mucous membranes, Good dentition, [ ]No lesions  NECK: Supple, normal appearance, No JVD; Normal thyroid; Trachea midline  NERVOUS SYSTEM:  Alert & Oriented X0, lethargic, does not follow commands  CHEST/LUNG: No chest deformity; Normal percussion bilaterally; No rales, rhonchi, wheezing   HEART: Regular rate and rhythm; No murmurs, rubs, or gallops  ABDOMEN: Soft, Nontender, Nondistended;Bowel sounds present  EXTREMITIES:  2+ Peripheral Pulses, No clubbing, cyanosis, or edema  LYMPH: No lymphadenopathy noted  SKIN: No rashes or lesions; Good capillary refill      LABS:  CBC Full  -  ( 08 Apr 2020 06:57 )  WBC Count : x  RBC Count : 5.64 M/uL  Hemoglobin : 16.0 g/dL  Hematocrit : 49.6 %  Platelet Count - Automated : 244 K/uL  Mean Cell Volume : 87.9 fl  Mean Cell Hemoglobin : 28.4 pg  Mean Cell Hemoglobin Concentration : 32.3 gm/dL  Auto Neutrophil # : x  Auto Lymphocyte # : x  Auto Monocyte # : x  Auto Eosinophil # : x  Auto Basophil # : x  Auto Neutrophil % : x  Auto Lymphocyte % : x  Auto Monocyte % : x  Auto Eosinophil % : x  Auto Basophil % : x    04-08    163<HH>  |  132<H>  |  29<H>  ----------------------------<  106<H>  3.7   |  26  |  1.18    Ca    9.7      08 Apr 2020 06:57  Phos  2.6     04-08  Mg     2.4     04-08    TPro  7.8  /  Alb  2.5<L>  /  TBili  0.4  /  DBili  x   /  AST  55<H>  /  ALT  43  /  AlkPhos  167<H>  04-08    PT/INR - ( 07 Apr 2020 11:49 )   PT: 11.6 sec;   INR: 1.03 ratio         PTT - ( 07 Apr 2020 11:49 )  PTT:27.8 sec        RADIOLOGY & ADDITIONAL STUDIES REVIEWED:  ***    [ ]GOALS OF CARE DISCUSSION WITH PATIENT/FAMILY/PROXY:    CRITICAL CARE TIME SPENT: 35 minutes

## 2020-04-08 NOTE — CHART NOTE - NSCHARTNOTEFT_GEN_A_CORE
Assessment:   Patient is a 65y old  Male who presents with a chief complaint of DKA (2020 10:45). MD consult for TF recommendations. Current TF order suggested as initial goal. Pt with DKA, a1c>15.5. See assessment       Factors impacting intake: [ ] none [ ] nausea  [ ] vomiting [ ] diarrhea [ ] constipation  [ ]chewing problems [ ] swallowing issues  [ ] other:     Diet Prescription: Diet, NPO with Tube Feed:   Tube Feeding Modality: Nasogastric  Glucerna 1.5 Mushtaq  Total Volume for 24 Hours (mL): 960  Continuous  Starting Tube Feed Rate {mL per Hour}: 15  Increase Tube Feed Rate by (mL): 5     Every 2 hours  Until Goal Tube Feed Rate (mL per Hour): 40  Tube Feed Duration (in Hours): 24  Tube Feed Start Time: 21:00 (20 @ 20:13)      Daily Height in cm: 188 (2020 05:21)  Height in cm: 187.96 (2020 20:12)      Daily Weight in k.3 (2020 16:47)      Pertinent Medications: MEDICATIONS  (STANDING):  cefTRIAXone   IVPB      cefTRIAXone   IVPB 1000 milliGRAM(s) IV Intermittent every 24 hours  dextrose 5%. 1000 milliLiter(s) (50 mL/Hr) IV Continuous <Continuous>  heparin  Injectable 5000 Unit(s) SubCutaneous every 8 hours  insulin glargine Injectable (LANTUS) 20 Unit(s) SubCutaneous every morning  insulin lispro (HumaLOG) corrective regimen sliding scale   SubCutaneous every 6 hours  metroNIDAZOLE  IVPB      metroNIDAZOLE  IVPB 500 milliGRAM(s) IV Intermittent every 8 hours  multivitamin 1 Tablet(s) Oral daily  sodium chloride 0.225%. 1000 milliLiter(s) (50 mL/Hr) IV Continuous <Continuous>  thiamine IVPB 500 milliGRAM(s) IV Intermittent daily    MEDICATIONS  (PRN):  glucagon  Injectable 1 milliGRAM(s) IntraMuscular once PRN Glucose LESS THAN 70 milligrams/deciliter  haloperidol    Injectable 2 milliGRAM(s) IntraMuscular every 6 hours PRN Agitation    Pertinent Labs:  Na163 mmol/L<HH> Glu 106 mg/dL<H> K+ 3.7 mmol/L Cr  1.18 mg/dL BUN 29 mg/dL<H>  Phos 2.6 mg/dL  Alb 2.5 g/dL<L> 04-05 [id number] >15.5 %<H>     CAPILLARY BLOOD GLUCOSE      POCT Blood Glucose.: 231 mg/dL (2020 11:30)  POCT Blood Glucose.: 204 mg/dL (2020 09:24)      Interventions:   Recommend  [ ] Change Diet To:  [ ] Nutrition Supplement  [x ] Nutrition Support: Glucerna 1.5 40x24 (960 ml, 1440 kcals, 79 gm protein). MD to monitor. RD available.   [x ] Other: MD to monitor. RD available.     Monitoring and Evaluation:   [ x ] Tolerance to diet prescription [ x ] weights [ x ] labs[ x ] follow up per protocol  [ ] other:

## 2020-04-08 NOTE — PROGRESS NOTE ADULT - ATTENDING COMMENTS
pneumonia, dka, respiratory failure, was tachypneic and obtubded, he was intubated.  will get chest xray, ABG post-intubation  lantus 20   d/c d5 water as he is hyperglycemic   start Free water   continue ceftriaxone and flagyl for pneumonia   send urine sg for hypernatremia and increased UO, start 1/4 NS repeat BMP

## 2020-04-09 NOTE — PROGRESS NOTE ADULT - PROBLEM SELECTOR PLAN 3
Continue lantus 20U every morning.  Continue sliding scale.  Monitor glucose. Continue lantus 20U every morning.  d/c D5 water   Continue sliding scale.  Monitor glucose.

## 2020-04-09 NOTE — ACUTE INTERFACILITY TRANSFER NOTE - PLAN OF CARE
To be able to be breath on his own. Continue mechanical ventilation. Monitor oxygen concentration. Normal glucose. Continue current insulin. Continue sliding scale coverage Likely to aspiration pneumonia. Continue antibiotics.

## 2020-04-09 NOTE — PROGRESS NOTE ADULT - PROBLEM SELECTOR PLAN 2
Likely secondary to aspiration.  Continue antibiotics as per MARLI Uribe.  Follow trough.  Serial ABGs. Likely secondary to aspiration.  Continue antibiotics ceftriaxone and flagyl   Follow trough.  Serial ABGs.

## 2020-04-09 NOTE — PROGRESS NOTE ADULT - SUBJECTIVE AND OBJECTIVE BOX
DNR [ ]   DNI  [  ]  Full Code    INTERVAL HPI/OVERNIGHT EVENTS: ***  No overnight events.    PRESSORS: [X ] YES [ ] NO  WHICH:  Phenylephrine    ANTIBIOTICS:                  DATE STARTED:  ANTIBIOTICS:                  DATE STARTED:  ANTIBIOTICS:                  DATE STARTED:    cefTRIAXone   IVPB      cefTRIAXone   IVPB 1000 milliGRAM(s) IV Intermittent every 24 hours  metroNIDAZOLE  IVPB      metroNIDAZOLE  IVPB 500 milliGRAM(s) IV Intermittent every 8 hours    Cardiovascular:  Heart Failure  Acute   Acute on Chronic  Chronic       phenylephrine    Infusion 0.1 MICROgram(s)/kG/Min IV Continuous <Continuous>    Pulmonary:    Hematalogic:  heparin  Injectable 5000 Unit(s) SubCutaneous every 8 hours    Other:  chlorhexidine 0.12% Liquid 15 milliLiter(s) Oral Mucosa every 12 hours  glucagon  Injectable 1 milliGRAM(s) IntraMuscular once PRN  insulin glargine Injectable (LANTUS) 20 Unit(s) SubCutaneous every morning  insulin lispro (HumaLOG) corrective regimen sliding scale   SubCutaneous every 6 hours  lactated ringers Bolus 500 milliLiter(s) IV Bolus once  multivitamin 1 Tablet(s) Oral daily  pantoprazole  Injectable 40 milliGRAM(s) IV Push daily  propofol Infusion 50 MICROgram(s)/kG/Min IV Continuous <Continuous>  thiamine IVPB 500 milliGRAM(s) IV Intermittent daily    cefTRIAXone   IVPB      cefTRIAXone   IVPB 1000 milliGRAM(s) IV Intermittent every 24 hours  chlorhexidine 0.12% Liquid 15 milliLiter(s) Oral Mucosa every 12 hours  glucagon  Injectable 1 milliGRAM(s) IntraMuscular once PRN  heparin  Injectable 5000 Unit(s) SubCutaneous every 8 hours  insulin glargine Injectable (LANTUS) 20 Unit(s) SubCutaneous every morning  insulin lispro (HumaLOG) corrective regimen sliding scale   SubCutaneous every 6 hours  lactated ringers Bolus 500 milliLiter(s) IV Bolus once  metroNIDAZOLE  IVPB      metroNIDAZOLE  IVPB 500 milliGRAM(s) IV Intermittent every 8 hours  multivitamin 1 Tablet(s) Oral daily  pantoprazole  Injectable 40 milliGRAM(s) IV Push daily  phenylephrine    Infusion 0.1 MICROgram(s)/kG/Min IV Continuous <Continuous>  propofol Infusion 50 MICROgram(s)/kG/Min IV Continuous <Continuous>  thiamine IVPB 500 milliGRAM(s) IV Intermittent daily    Drug Dosing Weight  Height (cm): 188 (04 Apr 2020 05:21)  Weight (kg): 72.3 (04 Apr 2020 05:21)  BMI (kg/m2): 20.5 (04 Apr 2020 05:21)  BSA (m2): 1.97 (04 Apr 2020 05:21)    CENTRAL LINE: [ ] YES [x ] NO  LOCATION:   DATE INSERTED:  REMOVE: [ ] YES [ ] NO  EXPLAIN:    KAPLAN: [x ] YES [ ] NO    DATE INSERTED:  REMOVE:  [ ] YES [x ] NO  EXPLAIN: Critically ill    A-LINE:  [ ] YES [x ] NO  LOCATION:   DATE INSERTED:  REMOVE:  [ ] YES [ ] NO  EXPLAIN:    PAST MEDICAL & SURGICAL HISTORY:  HTN (hypertension)  Diabetes mellitus  No significant past surgical history        ABG - ( 08 Apr 2020 13:57 )  pH, Arterial: 7.39  pH, Blood: x     /  pCO2: 37    /  pO2: 113   / HCO3: 22    / Base Excess: -2.2  /  SaO2: 98                    04-08 @ 07:01  -  04-09 @ 07:00  --------------------------------------------------------  IN: 3905 mL / OUT: 830 mL / NET: 3075 mL        Mode: AC/ CMV (Assist Control/ Continuous Mandatory Ventilation)  RR (machine): 18  TV (machine): 450  FiO2: 70  PEEP: 8  ITime: 1  MAP: 14  PIP: 25      PHYSICAL EXAM:    GENERAL: NAD, sedated  HEAD:  Atraumatic, Normocephalic  EYES: EOMI, PERRLA, conjunctiva and sclera clear  ENMT: No tonsillar erythema, exudates.  NECK: Supple, No JVD  NERVOUS SYSTEM:  Sedated.  CHEST/LUNG: Diminished breath sounds bilaterally.  HEART: Regular rate and rhythm; No murmurs, rubs, or gallops  ABDOMEN: Soft, Nontender, Nondistended; Bowel sounds present  EXTREMITIES:  2+ Peripheral Pulses, No clubbing, cyanosis, or edema  LYMPH: No lymphadenopathy noted  SKIN: No rashes or lesions  +Severe kyphosis    LABS:  CBC Full  -  ( 09 Apr 2020 06:12 )  WBC Count : 8.98 K/uL  RBC Count : 4.77 M/uL  Hemoglobin : 13.7 g/dL  Hematocrit : 43.2 %  Platelet Count - Automated : 182 K/uL  Mean Cell Volume : 90.6 fl  Mean Cell Hemoglobin : 28.7 pg  Mean Cell Hemoglobin Concentration : 31.7 gm/dL  Auto Neutrophil # : 6.42 K/uL  Auto Lymphocyte # : 1.93 K/uL  Auto Monocyte # : 0.51 K/uL  Auto Eosinophil # : 0.03 K/uL  Auto Basophil # : 0.02 K/uL  Auto Neutrophil % : 71.5 %  Auto Lymphocyte % : 21.5 %  Auto Monocyte % : 5.7 %  Auto Eosinophil % : 0.3 %  Auto Basophil % : 0.2 %    04-09    160<HH>  |  130<H>  |  41<H>  ----------------------------<  207<H>  3.9   |  23  |  1.62<H>    Ca    8.5      09 Apr 2020 06:12  Phos  3.4     04-09  Mg     2.5     04-09    TPro  6.2  /  Alb  2.0<L>  /  TBili  0.3  /  DBili  x   /  AST  101<H>  /  ALT  52  /  AlkPhos  220<H>  04-09              [  ]  DVT Prophylaxis  [  ]  Nutrition, Brand, Rate         Goal Rate         Abdominal Nutritional Status -  Malnutrition   Cachexia      Morbid Obesity BMI >/=40    RADIOLOGY & ADDITIONAL STUDIES:  ***  < from: Xray Chest 1 View- PORTABLE-Routine (04.09.20 @ 10:15) >  There is mild, improved right-sided interstitial infiltrate consistent with pneumonia or pneumonitis without segmental consolidation, layering effusion or cavitation. The heart is normal in size.    < end of copied text >    [  ] Goals of Care Discussion with Family/Proxy/Other           Elements of Conversation Discussed: Patient/Family understanding of current illness   Advanced Directives                                                                       Prognosis  Treatment Options  Care Aligned with patient's wishes                                             TIME SPENT: 35 minutes

## 2020-04-09 NOTE — PROGRESS NOTE ADULT - PROBLEM SELECTOR PLAN 5
DVT and GI prophylaxis.  Serial ABGs and CXRs.
DVT and GI prophylaxis.  SW consult. Patient lives alone. Not safe for him to go home.

## 2020-04-09 NOTE — PROGRESS NOTE ADULT - PROBLEM SELECTOR PLAN 1
S/P intubation yesterday.  Continue mechanical ventilation.  Serial ABGs and CXRs. S/P intubation yesterday.  likely from aspiration   COVID neg   ABG and adjust vent settings accordingly   Continue mechanical ventilation.  Serial ABGs and CXRs.

## 2020-04-09 NOTE — PROGRESS NOTE ADULT - SUBJECTIVE AND OBJECTIVE BOX
Received transfer from [place]; initially adm there with evidence of DKA ( bicarb 9, ph 7.2), non compliant with meds; went into resp failure and was intubated; [facility] note indicates that family thought he would have not wanted to be intubated before they could have decided. He was intubated and transf. here    VS: 98/77; HR 95; o2 sat 100% on 18/452/60 +5    heent nc at  chest barrel, decreased Bs  cvs s1s2 reg no m/g/r  and soft + bs  extr no e/c/c  neuro dedated    Peripheral  lines  no TLC or A-line                          13.7   8.98  )-----------( 182      ( 09 Apr 2020 06:12 )             43.2   04-09    160<HH>  |  130<H>  |  41<H>  ----------------------------<  207<H>  3.9   |  23  |  1.62<H>    Ca    8.5      09 Apr 2020 06:12  Phos  3.4     04-09  Mg     2.5     04-09    TPro  6.2  /  Alb  2.0<L>  /  TBili  0.3  /  DBili  x   /  AST  101<H>  /  ALT  52  /  AlkPhos  220<H>  04-09      * Acute hypoxic resp failure   - sec to PNA  - presumed aspiration  - current guidelines rec. Ceftriaxone; i will add zihromax he had infiltrate on adm  - poss COPD; albuterol  - abg at 1830 he came on 100% dropped to 60%    * T2DM  - start glucerna and c/w TRAORE am 15 units, sliding scale    * MArked hypernatremia  - start d5w at 100    * DVT px, GI  - LMWH and PPI

## 2020-04-09 NOTE — PROGRESS NOTE ADULT - PROBLEM SELECTOR PLAN 4
Continue free water.  BMP every 6 hours. Continue free water.  BMP every 6 hours.  star tTF watch for refeeding syndrome  repeat BMP

## 2020-04-09 NOTE — ACUTE INTERFACILITY TRANSFER NOTE - PROVIDER TOKENS
FREE:[LAST:[[facility]],PHONE:[(   )    -],FAX:[(   [phone]    -],ADDRESS:[[address] [place], [place]]]

## 2020-04-09 NOTE — ACUTE INTERFACILITY TRANSFER NOTE - HOSPITAL COURSE
Admitted with DKA. Noncompliant with medications at home. Developed respiratory failure secondary to aspiration pna. Intubated 4/8.

## 2020-04-10 NOTE — DIETITIAN INITIAL EVALUATION ADULT. - PERTINENT MEDS FT
MEDICATIONS  (STANDING):  chlorhexidine 0.12% Liquid 15 milliLiter(s) Oral Mucosa every 12 hours  chlorhexidine 4% Liquid 1 Application(s) Topical <User Schedule>  dextrose 5%. 1000 milliLiter(s) (50 mL/Hr) IV Continuous <Continuous>  dextrose 50% Injectable 12.5 Gram(s) IV Push once  dextrose 50% Injectable 25 Gram(s) IV Push once  dextrose 50% Injectable 25 Gram(s) IV Push once  heparin  Injectable 5000 Unit(s) SubCutaneous every 8 hours  insulin glargine Injectable (LANTUS) 30 Unit(s) SubCutaneous at bedtime  insulin lispro (HumaLOG) corrective regimen sliding scale   SubCutaneous every 6 hours  pantoprazole  Injectable 40 milliGRAM(s) IV Push daily  piperacillin/tazobactam IVPB.. 3.375 Gram(s) IV Intermittent every 8 hours  propofol Infusion 10 MICROgram(s)/kG/Min (4.38 mL/Hr) IV Continuous <Continuous>

## 2020-04-10 NOTE — DIETITIAN INITIAL EVALUATION ADULT. - NAME AND PHONE
Bonita Gerard MA, RDN, CDN, [facility]  [phone] (office number)  [phone] (pager number)

## 2020-04-10 NOTE — DIETITIAN INITIAL EVALUATION ADULT. - OTHER INFO
66yo male with PMH significant for NIDDM and HTN, p/w malaise and increased thirst distress; pt s/p transfer from [place].  Pt admitted with DKA with non-compliant with meds s/p resp failure and intubated.  suspected PNA, presumed aspiration with marked hypernatremia.  propofol infusing at 6.6mL/hr (=174Kcal/day).

## 2020-04-10 NOTE — PROGRESS NOTE ADULT - SUBJECTIVE AND OBJECTIVE BOX
CC:  Patient is a 65y old  Male who presents with a chief complaint of     HPI/BRIEF HOSPITAL COURSE:    65 yp PMH DM, HTN transferred from  initially adm there with evidence of DKA ( bicarb 9, ph 7.2), non compliant with meds; went into resp failure and was intubated; [facility] note indicates that family thought he would have not wanted to be intubated before they could have decided. He was intubated and transf. here    Events last 24 hours: Pt was placed on D5W for hypernatremia during the day. Pt 24 hours ago had Na 165, repeat . D5W d/c'd and pt placed on free water bolus's. Pt oxygen requirements titrated down ON, FiO2/PEEP 60/8 now down to 40/5. Pt recently hospitalized at  and has presumed aspiration PNA, Ceftriaxone and Azithro d/c'd and pt started on zosyn for potential HCAP vs. aspiration PNA. Pt Lantus increased and started at bedtime for elevated FS in 300's and coverage SSI q6h           PAST MEDICAL & SURGICAL HISTORY:  HTN (hypertension)  Diabetes mellitus  No significant past surgical history    Allergies    No Known Allergies    Intolerances      FAMILY HISTORY:  No pertinent family history in first degree relatives      Review of Systems:  ROS unable to be obtained as pt is intubated and sedated       Medications:  piperacillin/tazobactam IVPB.. 3.375 Gram(s) IV Intermittent every 8 hours        acetaminophen   Tablet .. 650 milliGRAM(s) Oral every 6 hours PRN  fentaNYL   Infusion. 0.5 MICROgram(s)/kG/Hr IV Continuous <Continuous>  propofol Infusion 10 MICROgram(s)/kG/Min IV Continuous <Continuous>      heparin  Injectable 5000 Unit(s) SubCutaneous every 8 hours    pantoprazole  Injectable 40 milliGRAM(s) IV Push daily      dextrose 40% Gel 15 Gram(s) Oral once PRN  dextrose 50% Injectable 12.5 Gram(s) IV Push once  dextrose 50% Injectable 25 Gram(s) IV Push once  dextrose 50% Injectable 25 Gram(s) IV Push once  glucagon  Injectable 1 milliGRAM(s) IntraMuscular once PRN  insulin glargine Injectable (LANTUS) 25 Unit(s) SubCutaneous at bedtime  insulin lispro (HumaLOG) corrective regimen sliding scale   SubCutaneous every 6 hours    dextrose 5%. 1000 milliLiter(s) IV Continuous <Continuous>      chlorhexidine 0.12% Liquid 15 milliLiter(s) Oral Mucosa every 12 hours  chlorhexidine 4% Liquid 1 Application(s) Topical <User Schedule>        Mode: AC/ CMV (Assist Control/ Continuous Mandatory Ventilation)  RR (machine): 18  TV (machine): 450  FiO2: 40  PEEP: 5  ITime: 1  PIP: 20      ICU Vital Signs Last 24 Hrs  T(C): 37.2 (09 Apr 2020 23:00), Max: 38.2 (09 Apr 2020 18:15)  T(F): 99 (09 Apr 2020 23:00), Max: 100.8 (09 Apr 2020 18:15)  HR: 65 (10 Apr 2020 04:09) (64 - 106)  BP: 101/75 (10 Apr 2020 04:00) (76/59 - 122/78)  BP(mean): 81 (10 Apr 2020 04:00) (63 - 88)  ABP: --  ABP(mean): --  RR: 0 (10 Apr 2020 04:00) (0 - 30)  SpO2: 98% (10 Apr 2020 04:09) (92% - 100%)    Vital Signs Last 24 Hrs  T(C): 37.2 (09 Apr 2020 23:00), Max: 38.2 (09 Apr 2020 18:15)  T(F): 99 (09 Apr 2020 23:00), Max: 100.8 (09 Apr 2020 18:15)  HR: 65 (10 Apr 2020 04:09) (64 - 106)  BP: 101/75 (10 Apr 2020 04:00) (76/59 - 122/78)  BP(mean): 81 (10 Apr 2020 04:00) (63 - 88)  RR: 0 (10 Apr 2020 04:00) (0 - 30)  SpO2: 98% (10 Apr 2020 04:09) (92% - 100%)    ABG - ( 09 Apr 2020 22:15 )  pH, Arterial: 7.41  pH, Blood: x     /  pCO2: 37    /  pO2: 101   / HCO3: 23    / Base Excess: -1.2  /  SaO2: 97                  I&O's Detail    09 Apr 2020 07:01  -  10 Apr 2020 04:20  --------------------------------------------------------  IN:    dextrose 5%.: 100 mL    Free Water: 200 mL    Glucerna 1.5: 320 mL    Lactated Ringers IV Bolus: 500 mL  Total IN: 1120 mL    OUT:    Indwelling Catheter - Urethral: 700 mL  Total OUT: 700 mL    Total NET: 420 mL            LABS:                        13.7   8.98  )-----------( 182      ( 09 Apr 2020 06:12 )             43.2     04-09    152<H>  |  123<H>  |  46<H>  ----------------------------<  343<H>  3.9   |  22  |  1.70<H>    Ca    7.8<L>      09 Apr 2020 20:51  Phos  3.4     04-09  Mg     2.5     04-09    TPro  6.2  /  Alb  2.0<L>  /  TBili  0.3  /  DBili  x   /  AST  101<H>  /  ALT  52  /  AlkPhos  220<H>  04-09          CAPILLARY BLOOD GLUCOSE      POCT Blood Glucose.: 333 mg/dL (10 Apr 2020 00:20)        CULTURES:  Culture Results:   <10,000 CFU/mL Normal Urogenital Isa (04-04 @ 16:29)  Culture Results:   No Growth Final (04-04 @ 10:58)  Culture Results:   No Growth Final (04-04 @ 10:58)    piperacillin/tazobactam IVPB.. 3.375 Gram(s) IV Intermittent every 8 hours      Physical Examination:  General: No acute distress.  Alert, oriented, interactive, nonfocal  NEURO: A&O X3, motor function 5/5 BL UE/LE  HEENT: Pupils equal, reactive to light.  Symmetric.  PULM: CTA BL, no significant sputum production, no wheezes, rales, rhonchi  CVS: Regular rate and rhythm, no murmurs, rubs, or gallops  ABD: Soft, nondistended, nontender, normoactive bowel sounds, no masses  EXT: No edema, nontender  SKIN: Warm and well perfused, no rashes noted          RADIOLOGY: < from: Xray Chest 1 View- PORTABLE-Routine (04.09.20 @ 10:15) >  There is mild, improved right-sided interstitial infiltrate consistent with pneumonia or pneumonitis without segmental consolidation, layering effusion or cavitation. The heart is normal in size.    < end of copied text >        CENTRAL LINE: N          DATE INSERTED:                  KAPLAN: N                        DATE INSERTED:                  A-LINE: N                       DATE INSERTED:                  GLOBAL ISSUE/BEST PRACTICE:  Analgesia: N/A  Sedation: propofol and fentanyl   HOB elevation: yes  Stress ulcer prophylaxis: protonix   VTE prophylaxis: Heparin SQ  Glycemic control: N/A  Nutrition: TF

## 2020-04-10 NOTE — CONSULT NOTE ADULT - SUBJECTIVE AND OBJECTIVE BOX
Patient is a 65y old  Male who presents with a chief complaint of transfer from [employer] (10 Apr 2020 14:01)    HPI:  66 yo M w/ DM, initially admitted to [facility] 4/3 with DKA, known to be noncompliant with meds, course c/b resp failure, intubated  and transferred to , noted on xray to have patchy b/l infiltrates R >L, COVID PCR (-) x , , remains intubated PEEP 4, Fio2 50, noted with low grade temps, was given rocephin/azithro and on IV zosyn d/t concern for asp pneumonitis.     PMH: as above  PSH: as above  Meds: per reconciliation sheet, noted below  MEDICATIONS  (STANDING):  chlorhexidine 0.12% Liquid 15 milliLiter(s) Oral Mucosa every 12 hours  chlorhexidine 4% Liquid 1 Application(s) Topical <User Schedule>  dextrose 5%. 1000 milliLiter(s) (50 mL/Hr) IV Continuous <Continuous>  dextrose 50% Injectable 12.5 Gram(s) IV Push once  dextrose 50% Injectable 25 Gram(s) IV Push once  dextrose 50% Injectable 25 Gram(s) IV Push once  heparin  Injectable 5000 Unit(s) SubCutaneous every 8 hours  insulin glargine Injectable (LANTUS) 30 Unit(s) SubCutaneous at bedtime  insulin lispro (HumaLOG) corrective regimen sliding scale   SubCutaneous every 6 hours  pantoprazole  Injectable 40 milliGRAM(s) IV Push daily  piperacillin/tazobactam IVPB.. 3.375 Gram(s) IV Intermittent every 8 hours  propofol Infusion 10 MICROgram(s)/kG/Min (4.38 mL/Hr) IV Continuous <Continuous>    Allergies    No Known Allergies    Intolerances      Social: no smoking, no alcohol, no illegal drugs; no recent travel, no exposure to TB  FAMILY HISTORY:  No pertinent family history in first degree relatives     no history of premature cardiovascular disease in first degree relatives    ROS: unable to obtain d/t medical condition   All other systems reviewed and are negative    Vital Signs Last 24 Hrs  T(C): 37.2 (2020 23:00), Max: 38.2 (2020 18:15)  T(F): 99 (2020 23:00), Max: 100.8 (2020 18:15)  HR: 115 (10 Apr 2020 13:00) (61 - 115)  BP: 166/98 (10 Apr 2020 13:00) (76/59 - 171/90)  BP(mean): 114 (10 Apr 2020 13:00) (63 - 114)  RR: 28 (10 Apr 2020 13:00) (0 - 30)  SpO2: 97% (10 Apr 2020 13:00) (93% - 100%)  Daily     Daily Weight in k (10 Apr 2020 11:47)    PE:  Constitutional: frail looking, intubated  HEENT: NC/AT, EOMI, PERRLA, conjunctivae clear; ears and nose atraumatic; pharynx benign  Neck: supple; thyroid not palpable  Back: no tenderness  Respiratory: decreased breath sounds  Cardiovascular: S1S2 regular, no murmurs  Abdomen: soft, not tender, not distended, positive BS; liver and spleen WNL  Genitourinary: no suprapubic tenderness  Lymphatic: no LN palpable  Musculoskeletal: no muscle tenderness, no joint swelling or tenderness  Extremities: no pedal edema  Neurological/ Psychiatric: no focal deficits   Skin: no rashes; no palpable lesions    Labs: all available labs reviewed                        12.7   9.53  )-----------( 152      ( 10 Apr 2020 07:06 )             39.7     04-10    155<H>  |  123<H>  |  49<H>  ----------------------------<  289<H>  3.7   |  24  |  1.65<H>    Ca    8.2<L>      10 Apr 2020 07:06  Phos  3.3     04-10  Mg     2.4     04-10    TPro  5.8<L>  /  Alb  1.8<L>  /  TBili  0.3  /  DBili  x   /  AST  43<H>  /  ALT  41  /  AlkPhos  207<H>  04-10     LIVER FUNCTIONS - ( 10 Apr 2020 07:06 )  Alb: 1.8 g/dL / Pro: 5.8 gm/dL / ALK PHOS: 207 U/L / ALT: 41 U/L / AST: 43 U/L / GGT: x                 Radiology: all available radiological tests reviewed  < from: Xray Chest 1 View- PORTABLE-Routine (20 @ 10:15) >  EXAM:  XR CHEST PORTABLE ROUTINE 1V                            PROCEDURE DATE:  2020          INTERPRETATION:  Views:1  Comparison: Yesterday  History: Diabetic ketoacidosis    There is mild, improved right-sided interstitial infiltrate consistent with pneumonia or pneumonitis without segmental consolidation, layering effusion or cavitation. The heart is normal in size.        Advanced directives addressed: full resuscitation

## 2020-04-10 NOTE — DIETITIAN INITIAL EVALUATION ADULT. - PHYSICAL APPEARANCE
other (specify) NFPE not completed 2/2 isolation protocol.   (+2) generalized.  (+3) Lt/Rt hand/ankle edema.  no BM documented; monitor and add bowel regimen prn.  angelito score of 12; no PU noted.

## 2020-04-10 NOTE — PROGRESS NOTE ADULT - SUBJECTIVE AND OBJECTIVE BOX
Pt with h/o DM, non compliant with diet adm to [place] for DKA ( AG 24, bicarb 9) developed resp failure due to PNA ( CAP vs aspiration, pt had abnormal CXR when he was adm to [facility]). COVID NEGX2 AT [facility].    Sedation tapered this am, on PS not waking up. RR 30-34; tidal vol 320-340ml    MV day 2    [place]; no lines    ICU Vital Signs Last 24 Hrs  T(C): 37.2 (09 Apr 2020 23:00), Max: 38.2 (09 Apr 2020 18:15)  T(F): 99 (09 Apr 2020 23:00), Max: 100.8 (09 Apr 2020 18:15)  HR: 115 (10 Apr 2020 13:00) (61 - 115)  BP: 166/98 (10 Apr 2020 13:00) (76/59 - 171/90)  BP(mean): 114 (10 Apr 2020 13:00) (63 - 114)  ABP: --  ABP(mean): --  RR: 28 (10 Apr 2020 13:00) (0 - 30)  SpO2: 97% (10 Apr 2020 13:00) (93% - 100%)    heent nc pupils 2 mm reactive  chest barrel shaped, decreased BS  cvs s1s2 reg no m/g/r  abd soft + bs  neuro moves extremities no eye contact    1cbc Pt with h/o DM, non compliant with diet adm to [place] for DKA ( AG 24, bicarb 9) developed resp failure due to PNA ( CAP vs aspiration, pt had abnormal CXR when he was adm to [facility]). COVID NEGX2 AT [facility].  [facility] note indicates that family thought he would have not wanted to be intubated before they could have decided.   Sedation tapered this am, on PS not waking up. RR 30-34; tidal vol 320-340ml    MV day 2    [place]; no lines    ICU Vital Signs Last 24 Hrs  T(C): 37.2 (09 Apr 2020 23:00), Max: 38.2 (09 Apr 2020 18:15)  T(F): 99 (09 Apr 2020 23:00), Max: 100.8 (09 Apr 2020 18:15)  HR: 115 (10 Apr 2020 13:00) (61 - 115)  BP: 166/98 (10 Apr 2020 13:00) (76/59 - 171/90)  BP(mean): 114 (10 Apr 2020 13:00) (63 - 114)  ABP: --  ABP(mean): --  RR: 28 (10 Apr 2020 13:00) (0 - 30)  SpO2: 97% (10 Apr 2020 13:00) (93% - 100%)    heent nc pupils 2 mm reactive  chest barrel shaped, decreased BS  cvs s1s2 reg no m/g/r  abd soft + bs  neuro moves extremities no eye contact                          12.7   9.53  )-----------( 152      ( 10 Apr 2020 07:06 )             39.7   04-10    155<H>  |  123<H>  |  49<H>  ----------------------------<  289<H>  3.7   |  24  |  1.65<H>    Ca    8.2<L>      10 Apr 2020 07:06  Phos  3.3     04-10  Mg     2.4     04-10    TPro  5.8<L>  /  Alb  1.8<L>  /  TBili  0.3  /  DBili  x   /  AST  43<H>  /  ALT  41  /  AlkPhos  207<H>  04-10      Assessment  Acute hypoxic respiratory failure sec to CAPvs  aspiration  - ceftraixone and zithro- had infiltrate on the initial cxr  - poss COPD; albuterol  - failed SAT, SBT tidal vol 320-340    * T2DM  - start TF and c/w TRAORE am 15 units, sliding scale    * Marked hypernatremia  - improved  d5w at 100    * DVT px, GI  - LMWH and PPI      left message for son

## 2020-04-10 NOTE — CONSULT NOTE ADULT - ASSESSMENT
66 yo M w/ DM, initially admitted to [facility] 4/3 with DKA, known to be noncompliant with meds, course c/b resp failure, intubated 4/9 and transferred to , noted on xray to have patchy b/l infiltrates R >L, COVID PCR (-) x 4/4, 4/9, remains intubated PEEP 4, Fio2 50, noted with low grade temps, was given rocephin/azithro and on IV zosyn d/t concern for asp pneumonitis.     1. acute hypoxic resp failure. RLL pneumonia. aspiration. ? viral syndrome   - covid-19 pcr (-) ? false negative as clinical picture pna/resp failure worrisome for covid-19  - agree with IV zosyn for asp coverage  - hold off on plaquenil at this point  - isolation precautions  - monitor temps  - tolerating abx well so far; no side effects noted  - reason for abx use and side effects reviewed with patient  - supportive care  - f/u cbc    2. other issues - care per medicine

## 2020-04-10 NOTE — DIETITIAN INITIAL EVALUATION ADULT. - ENERGY INTAKE
Pt received 640mL of Glucerna 1.5 in past 24hours.  Which provided ~960kcal and 53g protein.  Which meets ~66% of estimated calorie needs and ~60% of estimated protein needs.  Pt is tolerating TF per RN.

## 2020-04-10 NOTE — DIETITIAN INITIAL EVALUATION ADULT. - PERTINENT LABORATORY DATA
04-10 Na155 mmol/L<H> Glu 289 mg/dL<H> K+ 3.7 mmol/L Cr  1.65 mg/dL<H> BUN 49 mg/dL<H> Phos 3.3 mg/dL Alb 1.8 g/dL<L> PAB n/a

## 2020-04-10 NOTE — DIETITIAN INITIAL EVALUATION ADULT. - ADD RECOMMEND
1) change TF Rx to Glucerna 1.5 @ 250mL TF Bolus four times daily with 1pkt Prosource TF and 90mL free water flush before/after each bolus 2) monitor BM; add bowel regimen prn 3) monitor TF tolerance; keep back of bed > 35 degrees 4) monitor hydration; adjust free water flushes prn 5) weekly wt checks to track/trend changes

## 2020-04-10 NOTE — DIETITIAN INITIAL EVALUATION ADULT. - ENTERAL
change TF Rx to Glucerna 1.5 @ 250mL TF Bolus four times daily with 1pkt Prosource TF and 90mL free water flush before/after each bolus (=1540Kcal, 94g protein, 1479mL free water, and total TF volume of 1000mL)

## 2020-04-11 NOTE — CHART NOTE - NSCHARTNOTEFT_GEN_A_CORE
Called for hypotension to MAP 59. HR had been increased while febrile overnight to 130s, currently 105. RR improved. Decreased breath sounds in the right lung where patient's known infiltrates are more prominent. O2 saturating 96% on 50% FiO2.  BP improved to MAP 69, patient not in obvious distress.  Will continue to monitor.    Aryles Hedjar, MD Called for hypotension to MAP 59. HR had been increased while febrile overnight to 130s, currently 105. RR improved. Decreased breath sounds in the right lung where patient's known infiltrates are more prominent. O2 saturating 96% on 50% FiO2.  BP improved to MAP 69, patient not in obvious distress.  Had received 500 ml bolus earlier in the evening for hypotension with improvement, but will hold off on further at this time. Will continue to monitor.    Aryles Hedjar, MD

## 2020-04-11 NOTE — PROGRESS NOTE ADULT - SUBJECTIVE AND OBJECTIVE BOX
Pt with h/o DM, non compliant with diet adm to [place] for DKA ( AG 24, bicarb 9) developed resp failure due to PNA ( CAP vs aspiration, pt had abnormal CXR when he was adm to [facility]). COVID NEGX2 AT [facility].  [facility] note indicates that family thought he would have not wanted to be intubated before they could have decided.   Sedation tapered this am, on PS not waking up and became tachycardic and hypoxic; he is back to AC    MV day 3    [place]; no lines    ICU Vital Signs Last 24 Hrs  T(C): 38.4 (11 Apr 2020 10:00), Max: 38.8 (11 Apr 2020 03:00)  T(F): 101.2 (11 Apr 2020 10:00), Max: 101.8 (11 Apr 2020 03:00)  HR: 126 (11 Apr 2020 10:00) (81 - 130)  BP: 132/98 (11 Apr 2020 10:00) (69/50 - 179/95)  BP(mean): 103 (11 Apr 2020 10:00) (54 - 116)  ABP: --  ABP(mean): --  RR: 32 (11 Apr 2020 10:00) (6 - 44)  SpO2: 97% (11 Apr 2020 10:00) (95% - 100%)      heent nc pupils 2 mm reactive  chest barrel shaped, decreased BS  cvs s1s2 reg no m/g/r  abd soft + bs  neuro failed SAT  skin mottled                                      12.9   10.51 )-----------( 189      ( 11 Apr 2020 07:10 )             40.3   04-11    149<H>  |  119<H>  |  43<H>  ----------------------------<  383<H>  4.1   |  25  |  1.62<H>    Ca    7.8<L>      11 Apr 2020 07:10  Phos  4.4     04-11  Mg     2.4     04-11    TPro  5.8<L>  /  Alb  1.8<L>  /  TBili  0.3  /  DBili  x   /  AST  43<H>  /  ALT  41  /  AlkPhos  207<H>  04-10        Assessment  Acute hypoxic respiratory failure sec to CAPvs  aspiration  - ceftraixone and zithro- had infiltrate on the initial cxr- were changed to zosyn  - poss COPD; albuterol and atrovent added  - failed SAT,   - cxr looked worse abx changed to zosyn    * T2DM  - start TF and increase TRAORE , sliding scale and pre meal humalog     * Marked hypernatremia  - improved  d5w at 100    * DVT px, GI  - LMWH and PPI      spoke with  son karol Glass tearful, full code for now  [phone]

## 2020-04-12 NOTE — PROGRESS NOTE ADULT - SUBJECTIVE AND OBJECTIVE BOX
Responded to CODE BLUE  Initial rhythm bradycardia --> asystole  Per RN, patient was hypotensive prior to cardiac arrest, no hypoxemia noted  AM ABG and yesterday's labs reviewed  CPR was in progress per ACLS protocol (see code sheet)    After 12 minutes, I called and spoke with patient's son Quan Radford and updated him on his dad's current status with CPR ongoing  Quan confided that his father stopped taking care of himself long ago, and has not been doing well recently.  He asked me to stop CPR  I invited him to come to the hospital, and he stated he would be on his way    CPR was ceased  Patient was noted to have an organized rhythm with a pulse  Progressed to VT with a pulse, and Amiodarone 300mg IV was given  Converted to NSR --> bigeminy   Eventually progressed to PEA, asystole and was pronounced

## 2020-04-12 NOTE — DISCHARGE NOTE FOR THE EXPIRED PATIENT - HOSPITAL COURSE
66 yo M with a PMH uncontrolled diabetes (A1C > 15.5) who presented with DKA to [facility]. He subsequently developed respiratory failure in the setting of pneumonia and was intubated on  and started on antibiotics. He was tested negative for COVID x2. His DKA improved but he was transferred to [facility] on , where appeared to have developed an aspiration pneumonia and was changed on his antibiotics to Zosyn. His sugars were labile and his insulin was adjusted. He was never able to be weaned off of the ventilator.  On 20, called by RN for BP 40s/20s, had been given 500 cc bolus before. Began another bolus while HR became bradycardic from 80s to 40s. Levophed was ordered and had just started when the patient became pulseless with PEA, which subsequently became asystole at 6:15 AM. CPR was begun, and the patient was given 1 mg Epinephrine push x 3. The rhythm changed to vfib and defibrillator was charged to shock but family (patient's son Quan Radford) was called and stated he wished to make the patient DNR. CPR was subsequently stopped and the patient subsequently had a pulse which became pulseless vtach in 200s. 300 mg amiodarone was given. The patient became normal sinus rhythm before changing to PEA arrest with intermittent weak sinus beats. CPR as noted above was not restarted, and at 6:44AM on 20, he became asystolic on the monitor, no cardiac sounds were auscultated, no pulses were palpated, and the patient was pronounced . Son notified, and is coming to see his father.

## 2020-04-12 NOTE — CHART NOTE - NSCHARTNOTEFT_GEN_A_CORE
Called by RN for BP 40s/20s, had been given 500 cc bolus before. Began another bolus while HR became bradycardic from 80s to 40s. Levophed was ordered and had just started when the patient became pulseless with PEA, which subsequently became asystole at 6:15 AM. CPR was begun, and the patient was given 1 mg Epinephrine push x 3. The rhythm changed to vfib and defibrillator was charged to shock but family (patient's son Quan Radford) was called and stated he wished to make the patient DNR. CPR was subsequently stopped and the patient subsequently had a pulse which became pulseless vtach in 200s. 300 mg amiodarone was given. The patient became normal sinus rhythm before changing to PEA arrest with intermittent weak sinus beats. CPR as noted above was not restarted, and at 6:44AM on 20, he became asystolic on the monitor, no cardiac sounds were auscultated, no pulses were palpated, and the patient was pronounced . Son notified, and is coming to see his father.    Aryles Hedjar, MD

## 2022-03-22 NOTE — PROGRESS NOTE ADULT - ASSESSMENT
Assessment  - Moderate DKA, Resolving  - SHAW    Neuro  - Drowsy, no focal neurologic deficits, gag intact    Cardio  HTN  - Elevated BP, recently given Amlodipine,     Resp  Moderate Respiratory Distress  - Likely 2/2 DKA as compensatory mechanism; will monitor closely for intubation PRN if patient fatigues  - No evidence of hypoxia as per ABG    GI  - No acute issues; NPO till AG closes and appetite improves    Renal  SHAW  - Unknown baseline; p/w 0.4 increase in Cr/2 days (1.5 from 1.1); likely prerenal, C/w IVFs    Endo  Moderate DKA  - 2/2 noncompliance; AG 24, Glc 453, and HCO3 9  - C/w IVF, Insulin drip, and monitor BMP Q4    Heme  - No acute issues; DVT PPx w/ HSQ    ID   - Sending COVID as per attending    Psych  - No acute issues Assessment  - Moderate DKA, Resolving  - SHAW    Neuro  - Drowsy, no focal neurologic deficits, gag intact    Cardio  HTN  - Elevated BP, recently given Amlodipine,     Resp  Moderate Respiratory Distress  - Likely secondary to DKA as compensatory mechanism; will monitor closely for intubation PRN if patient fatigues  - No evidence of hypoxia as per ABG    GI  - No acute issues; NPO till AG closes and appetite improves    Renal  SHAW  - Unknown baseline; p/w 0.4 increase in Cr/2 days (1.5 from 1.1); likely prerenal, C/w IVFs    Endo  Moderate DKA  - secondary to noncompliance; AG 24, Glc 453, and HCO3 9  - C/w IVF, Insulin drip, and monitor BMP Q4    Heme  - No acute issues; DVT PPx w/ HSQ    ID   - Sending COVID as per attending  - COVID neg    Psych  - No acute issues    F    E  N  G Assessment  - Moderate DKA, Resolving  - SHAW    Neuro  - Drowsy, no focal neurologic deficits, gag intact    Cardio  HTN  - Elevated BP, recently given Amlodipine,     Resp  Moderate Respiratory Distress  - Likely secondary to DKA as compensatory mechanism; will monitor closely for intubation PRN if patient fatigues  - No evidence of hypoxia as per ABG    GI  - No acute issues; NPO till AG closes and appetite improves    Renal  SHAW  - Unknown baseline; p/w 0.4 increase in Cr/2 days (1.5 from 1.1); likely prerenal, C/w IVFs    Endo  Moderate DKA  - secondary to noncompliance; AG 24, Glc 453, and HCO3 9  - C/w IVF, Insulin drip, and monitor BMP Q4    Heme  - No acute issues; DVT PPx w/ HSQ    ID   - Sending COVID as per attending  - COVID neg    Psych  - No acute issues    F: D5, until diet tolerated  E: Replete K<4, Mg<2  N: DASH diet  G: protonix 40mg IV daily No

## 2022-10-07 NOTE — ED PROVIDER NOTE - NS ED MD DISPO DISCHARGE
Patient's depression is recurrent and is mild without psychosis. Their depression is currently in partial remission and the condition is improving with treatment. This will be reassessed at the next regular appointment. F/U as described:patient will continue current medication therapy.   Home

## 2023-02-07 NOTE — PROCEDURE NOTE - NSSITEPREP_SKIN_A_CORE
chlorhexidine
chlorhexidine
Bilobed Transposition Flap Text: Because of the orientation and full-thickness nature of the defect, and in order to avoid distortion of the surrounding structures, a bilobed flap was planned.  After prep and local anesthesia, the flap was then outlined, incised and elevated.  The skin was widely undermined to allow for closure of the donor site defect.  After hemostasis obtained, the flaps were transposed into place and sutured in a layered fashion.

## 2023-04-07 NOTE — ED PROVIDER NOTE - CONSTITUTIONAL APPEARANCE HYGIENE, MLM
04/07/23                            Josselyn You  [id number]  [facility] [id number]    To Whom It May Concern:    This is to certify Josselyn You was evaluated with Mariela Mendez NP on 04/07/23 and is excused from work today for rib injury.            Electronically signed by:  Mariela Mendez NP  [facility]  [address] [facility] [address]  Dept Phone: [phone]      ILL APPEARING

## 2024-02-24 NOTE — PROCEDURE NOTE - NSANATOMICLLOCATION_GEN_A_CORE
femoral artery/right As per care coordination note, pt is a poor historian. Per note, pt lives in a  with spouse and two adult children, with 4 steps to enter, bedroom on second floor in which he recently has not been using and instead has been sleeping on recliner on 1st floor. Per note, PTA pt was ambulating with a cane and independent in ADLs. As per care coordination note/from last PT eval & confirmed with pt today, pt lives in a PH with spouse and two adult children, with 4 steps to enter, bedroom on second floor in which he recently has not been using and instead has been sleeping on recliner on 1st floor. Per note, PTA pt was ambulating with a cane and independent in ADLs.